# Patient Record
Sex: FEMALE | Race: WHITE
[De-identification: names, ages, dates, MRNs, and addresses within clinical notes are randomized per-mention and may not be internally consistent; named-entity substitution may affect disease eponyms.]

---

## 2018-12-03 PROBLEM — Z00.00 ENCOUNTER FOR PREVENTIVE HEALTH EXAMINATION: Status: ACTIVE | Noted: 2018-12-03

## 2018-12-28 ENCOUNTER — APPOINTMENT (OUTPATIENT)
Dept: NEPHROLOGY | Facility: CLINIC | Age: 83
End: 2018-12-28
Payer: MEDICARE

## 2018-12-28 VITALS — DIASTOLIC BLOOD PRESSURE: 76 MMHG | WEIGHT: 198 LBS | SYSTOLIC BLOOD PRESSURE: 130 MMHG | HEART RATE: 80 BPM

## 2018-12-28 DIAGNOSIS — E87.2 ACIDOSIS: ICD-10-CM

## 2018-12-28 DIAGNOSIS — Z78.9 OTHER SPECIFIED HEALTH STATUS: ICD-10-CM

## 2018-12-28 DIAGNOSIS — Z87.42 PERSONAL HISTORY OF OTHER DISEASES OF THE FEMALE GENITAL TRACT: ICD-10-CM

## 2018-12-28 DIAGNOSIS — N17.9 ACUTE KIDNEY FAILURE, UNSPECIFIED: ICD-10-CM

## 2018-12-28 DIAGNOSIS — N20.0 CALCULUS OF KIDNEY: ICD-10-CM

## 2018-12-28 PROCEDURE — 99205 OFFICE O/P NEW HI 60 MIN: CPT

## 2018-12-31 LAB
ALBUMIN SERPL ELPH-MCNC: 5 G/DL
ANION GAP SERPL CALC-SCNC: 17 MMOL/L
APPEARANCE: CLEAR
BACTERIA UR CULT: NORMAL
BACTERIA: NEGATIVE
BILIRUBIN URINE: NEGATIVE
BLOOD URINE: NEGATIVE
BUN SERPL-MCNC: 39 MG/DL
CALCIUM SERPL-MCNC: 10.1 MG/DL
CALCIUM SERPL-MCNC: 10.1 MG/DL
CHLORIDE SERPL-SCNC: 102 MMOL/L
CO2 SERPL-SCNC: 22 MMOL/L
COLOR: YELLOW
CREAT SERPL-MCNC: 1.62 MG/DL
CREAT SPEC-SCNC: 116 MG/DL
GLUCOSE QUALITATIVE U: NEGATIVE MG/DL
GLUCOSE SERPL-MCNC: 110 MG/DL
HYALINE CASTS: 3 /LPF
KETONES URINE: NEGATIVE
LEUKOCYTE ESTERASE URINE: ABNORMAL
MICROALBUMIN 24H UR DL<=1MG/L-MCNC: <1.2 MG/DL
MICROALBUMIN/CREAT 24H UR-RTO: NORMAL
MICROSCOPIC-UA: NORMAL
NITRITE URINE: NEGATIVE
PARATHYROID HORMONE INTACT: 175 PG/ML
PH URINE: 5.5
PHOSPHATE SERPL-MCNC: 3.2 MG/DL
POTASSIUM SERPL-SCNC: 3.7 MMOL/L
PROTEIN URINE: NEGATIVE MG/DL
RED BLOOD CELLS URINE: 2 /HPF
SODIUM SERPL-SCNC: 141 MMOL/L
SPECIFIC GRAVITY URINE: 1.02
SQUAMOUS EPITHELIAL CELLS: 1 /HPF
UROBILINOGEN URINE: NEGATIVE MG/DL
WHITE BLOOD CELLS URINE: 3 /HPF

## 2019-07-08 ENCOUNTER — APPOINTMENT (OUTPATIENT)
Dept: NEPHROLOGY | Facility: CLINIC | Age: 84
End: 2019-07-08

## 2023-09-06 ENCOUNTER — INPATIENT (INPATIENT)
Facility: HOSPITAL | Age: 88
LOS: 12 days | Discharge: NURSING, REHAB OR LONG-TERM CARE | DRG: 562 | End: 2023-09-19
Attending: STUDENT IN AN ORGANIZED HEALTH CARE EDUCATION/TRAINING PROGRAM | Admitting: INTERNAL MEDICINE
Payer: MEDICARE

## 2023-09-06 VITALS
WEIGHT: 179.9 LBS | OXYGEN SATURATION: 97 % | RESPIRATION RATE: 16 BRPM | DIASTOLIC BLOOD PRESSURE: 64 MMHG | HEART RATE: 79 BPM | SYSTOLIC BLOOD PRESSURE: 152 MMHG | TEMPERATURE: 98 F

## 2023-09-06 DIAGNOSIS — S82.832A OTHER FRACTURE OF UPPER AND LOWER END OF LEFT FIBULA, INITIAL ENCOUNTER FOR CLOSED FRACTURE: ICD-10-CM

## 2023-09-06 LAB
ALBUMIN SERPL ELPH-MCNC: 3.2 G/DL — LOW (ref 3.3–5)
ALP SERPL-CCNC: 94 U/L — SIGNIFICANT CHANGE UP (ref 40–120)
ALT FLD-CCNC: 16 U/L — SIGNIFICANT CHANGE UP (ref 10–45)
ANION GAP SERPL CALC-SCNC: 7 MMOL/L — SIGNIFICANT CHANGE UP (ref 5–17)
APPEARANCE UR: CLEAR — SIGNIFICANT CHANGE UP
AST SERPL-CCNC: 18 U/L — SIGNIFICANT CHANGE UP (ref 10–40)
BACTERIA # UR AUTO: ABNORMAL /HPF
BASOPHILS # BLD AUTO: 0.05 K/UL — SIGNIFICANT CHANGE UP (ref 0–0.2)
BASOPHILS NFR BLD AUTO: 0.6 % — SIGNIFICANT CHANGE UP (ref 0–2)
BILIRUB SERPL-MCNC: 0.7 MG/DL — SIGNIFICANT CHANGE UP (ref 0.2–1.2)
BILIRUB UR-MCNC: NEGATIVE — SIGNIFICANT CHANGE UP
BUN SERPL-MCNC: 26 MG/DL — HIGH (ref 7–23)
CALCIUM SERPL-MCNC: 9.9 MG/DL — SIGNIFICANT CHANGE UP (ref 8.4–10.5)
CHLORIDE SERPL-SCNC: 107 MMOL/L — SIGNIFICANT CHANGE UP (ref 96–108)
CO2 SERPL-SCNC: 24 MMOL/L — SIGNIFICANT CHANGE UP (ref 22–31)
COLOR SPEC: YELLOW — SIGNIFICANT CHANGE UP
CREAT SERPL-MCNC: 1.28 MG/DL — SIGNIFICANT CHANGE UP (ref 0.5–1.3)
DIFF PNL FLD: NEGATIVE — SIGNIFICANT CHANGE UP
EGFR: 41 ML/MIN/1.73M2 — LOW
EOSINOPHIL # BLD AUTO: 0.15 K/UL — SIGNIFICANT CHANGE UP (ref 0–0.5)
EOSINOPHIL NFR BLD AUTO: 1.9 % — SIGNIFICANT CHANGE UP (ref 0–6)
EPI CELLS # UR: 1 — SIGNIFICANT CHANGE UP
GLUCOSE SERPL-MCNC: 105 MG/DL — HIGH (ref 70–99)
GLUCOSE UR QL: NEGATIVE MG/DL — SIGNIFICANT CHANGE UP
HCT VFR BLD CALC: 36.4 % — SIGNIFICANT CHANGE UP (ref 34.5–45)
HGB BLD-MCNC: 11.9 G/DL — SIGNIFICANT CHANGE UP (ref 11.5–15.5)
IMM GRANULOCYTES NFR BLD AUTO: 0.8 % — SIGNIFICANT CHANGE UP (ref 0–0.9)
KETONES UR-MCNC: NEGATIVE MG/DL — SIGNIFICANT CHANGE UP
LEUKOCYTE ESTERASE UR-ACNC: NEGATIVE — SIGNIFICANT CHANGE UP
LYMPHOCYTES # BLD AUTO: 1.39 K/UL — SIGNIFICANT CHANGE UP (ref 1–3.3)
LYMPHOCYTES # BLD AUTO: 17.6 % — SIGNIFICANT CHANGE UP (ref 13–44)
MCHC RBC-ENTMCNC: 29.5 PG — SIGNIFICANT CHANGE UP (ref 27–34)
MCHC RBC-ENTMCNC: 32.7 GM/DL — SIGNIFICANT CHANGE UP (ref 32–36)
MCV RBC AUTO: 90.1 FL — SIGNIFICANT CHANGE UP (ref 80–100)
MONOCYTES # BLD AUTO: 0.43 K/UL — SIGNIFICANT CHANGE UP (ref 0–0.9)
MONOCYTES NFR BLD AUTO: 5.4 % — SIGNIFICANT CHANGE UP (ref 2–14)
NEUTROPHILS # BLD AUTO: 5.83 K/UL — SIGNIFICANT CHANGE UP (ref 1.8–7.4)
NEUTROPHILS NFR BLD AUTO: 73.7 % — SIGNIFICANT CHANGE UP (ref 43–77)
NITRITE UR-MCNC: NEGATIVE — SIGNIFICANT CHANGE UP
NRBC # BLD: 0 /100 WBCS — SIGNIFICANT CHANGE UP (ref 0–0)
PH UR: 5 — SIGNIFICANT CHANGE UP (ref 5–8)
PLATELET # BLD AUTO: 230 K/UL — SIGNIFICANT CHANGE UP (ref 150–400)
POTASSIUM SERPL-MCNC: 4.9 MMOL/L — SIGNIFICANT CHANGE UP (ref 3.5–5.3)
POTASSIUM SERPL-SCNC: 4.9 MMOL/L — SIGNIFICANT CHANGE UP (ref 3.5–5.3)
PROT SERPL-MCNC: 6.8 G/DL — SIGNIFICANT CHANGE UP (ref 6–8.3)
PROT UR-MCNC: 30 MG/DL
RBC # BLD: 4.04 M/UL — SIGNIFICANT CHANGE UP (ref 3.8–5.2)
RBC # FLD: 12.8 % — SIGNIFICANT CHANGE UP (ref 10.3–14.5)
RBC CASTS # UR COMP ASSIST: 0 /HPF — SIGNIFICANT CHANGE UP (ref 0–4)
SARS-COV-2 RNA SPEC QL NAA+PROBE: DETECTED
SODIUM SERPL-SCNC: 138 MMOL/L — SIGNIFICANT CHANGE UP (ref 135–145)
SP GR SPEC: 1.01 — SIGNIFICANT CHANGE UP (ref 1–1.03)
UROBILINOGEN FLD QL: 0.2 MG/DL — SIGNIFICANT CHANGE UP (ref 0.2–1)
WBC # BLD: 7.91 K/UL — SIGNIFICANT CHANGE UP (ref 3.8–10.5)
WBC # FLD AUTO: 7.91 K/UL — SIGNIFICANT CHANGE UP (ref 3.8–10.5)
WBC UR QL: 2 /HPF — SIGNIFICANT CHANGE UP (ref 0–5)

## 2023-09-06 PROCEDURE — 99223 1ST HOSP IP/OBS HIGH 75: CPT

## 2023-09-06 PROCEDURE — 73630 X-RAY EXAM OF FOOT: CPT | Mod: 26,LT

## 2023-09-06 PROCEDURE — 70450 CT HEAD/BRAIN W/O DYE: CPT | Mod: 26,MA

## 2023-09-06 PROCEDURE — 73610 X-RAY EXAM OF ANKLE: CPT | Mod: 26,LT

## 2023-09-06 PROCEDURE — 71045 X-RAY EXAM CHEST 1 VIEW: CPT | Mod: 26

## 2023-09-06 PROCEDURE — 93010 ELECTROCARDIOGRAM REPORT: CPT

## 2023-09-06 PROCEDURE — 99285 EMERGENCY DEPT VISIT HI MDM: CPT | Mod: FS

## 2023-09-06 RX ORDER — ACETAMINOPHEN 500 MG
650 TABLET ORAL EVERY 6 HOURS
Refills: 0 | Status: DISCONTINUED | OUTPATIENT
Start: 2023-09-06 | End: 2023-09-19

## 2023-09-06 RX ORDER — LANOLIN ALCOHOL/MO/W.PET/CERES
3 CREAM (GRAM) TOPICAL AT BEDTIME
Refills: 0 | Status: DISCONTINUED | OUTPATIENT
Start: 2023-09-06 | End: 2023-09-19

## 2023-09-06 RX ORDER — ATORVASTATIN CALCIUM 80 MG/1
10 TABLET, FILM COATED ORAL AT BEDTIME
Refills: 0 | Status: DISCONTINUED | OUTPATIENT
Start: 2023-09-06 | End: 2023-09-19

## 2023-09-06 RX ORDER — ONDANSETRON 8 MG/1
4 TABLET, FILM COATED ORAL EVERY 8 HOURS
Refills: 0 | Status: DISCONTINUED | OUTPATIENT
Start: 2023-09-06 | End: 2023-09-19

## 2023-09-06 RX ORDER — ENOXAPARIN SODIUM 100 MG/ML
40 INJECTION SUBCUTANEOUS EVERY 24 HOURS
Refills: 0 | Status: DISCONTINUED | OUTPATIENT
Start: 2023-09-06 | End: 2023-09-19

## 2023-09-06 RX ORDER — QUETIAPINE FUMARATE 200 MG/1
25 TABLET, FILM COATED ORAL AT BEDTIME
Refills: 0 | Status: DISCONTINUED | OUTPATIENT
Start: 2023-09-06 | End: 2023-09-06

## 2023-09-06 RX ADMIN — ATORVASTATIN CALCIUM 10 MILLIGRAM(S): 80 TABLET, FILM COATED ORAL at 22:55

## 2023-09-06 NOTE — ED PROVIDER NOTE - NSICDXPASTMEDICALHX_GEN_ALL_CORE_FT
PAST MEDICAL HISTORY:  Chronic kidney disease, unspecified CKD stage      PAST MEDICAL HISTORY:  Chronic kidney disease, unspecified CKD stage     HLD (hyperlipidemia)     HTN (hypertension)

## 2023-09-06 NOTE — ED ADULT NURSE NOTE - NSFALLUNIVINTERV_ED_ALL_ED
Bed/Stretcher in lowest position, wheels locked, appropriate side rails in place/Call bell, personal items and telephone in reach/Instruct patient to call for assistance before getting out of bed/chair/stretcher/Non-slip footwear applied when patient is off stretcher/[place] to call system/Physically safe environment - no spills, clutter or unnecessary equipment/Purposeful proactive rounding/Room/bathroom lighting operational, light cord in reach

## 2023-09-06 NOTE — ED PROVIDER NOTE - OBJECTIVE STATEMENT
88 yr old female with hx of dementia, recent positive covid on 8/29 and was on paxlovid with recent negative test as per son presents with left ankle pain and swelling. Son and daughter in law at bedside and [place] but since dx with covid pt has been living with them. Pt has chronic unsteady when walking. Pt was found sitting on buttock at house at 3 am 3 days ago. Pt now unable to walk since fall. Unsure mechanism of fall. Unknown if hit head. + swelling to left ankle.

## 2023-09-06 NOTE — ED PROVIDER NOTE - ATTENDING APP SHARED VISIT CONTRIBUTION OF CARE
Kayden with [facility]. 88 yr old female with hx of dementia, recent positive covid on 8/29 and was on paxlovid with recent negative test as per son presents with left ankle pain and swelling. Son and daughter in law at bedside and reports [place] the city alone but since dx with covid pt has been living with them. Pt has chronic unsteady when walking. Pt was found sitting on buttock at house at 3 am 3 days ago. Pt now unable to walk since fall. Unsure mechanism of fall. Unknown if hit head. + swelling to left ankle.  left ankle/ foot: tenderness medially, pain on ROM, + swelling, positive 2+ pedal pulse   spine- no bony tenderness    Family reports pt unable to live with them.   labs, xray, ekg, and likely admit to medicine due to unable to ambulate and not a safe discharge.    1620: labs and imaging reviewed. + ankle fx seen, stirrup splint applied. pt tolerated with no complaints. plan to admit to medicine. d/w hospitalist. Pt unable to care for self. Also with dementia living alone.     I performed a face to face bedside interview with patient regarding history of present illness, review of symptoms and past medical history. I completed an independent physical exam.  I have discussed the patient's plan of care with Physician Assistant (PA). I agree with note as stated above, having amended the EMR as needed to reflect my findings.   This includes History of Present Illness, HIV, Past Medical/Surgical/Family/Social History, Allergies and Home Medications, Review of Systems, Physical Exam, and any Progress Notes during the time I functioned as the attending physician for this patient.

## 2023-09-06 NOTE — ED PROVIDER NOTE - CLINICAL SUMMARY MEDICAL DECISION MAKING FREE TEXT BOX
88 yr old female with hx of dementia, recent positive covid on 8/29 and was on paxlovid with recent negative test as per son presents with left ankle pain and swelling. Son and daughter in law at bedside and reports [place] the city alone but since dx with covid pt has been living with them. Pt has chronic unsteady when walking. Pt was found sitting on buttock at house at 3 am 3 days ago. Pt now unable to walk since fall. Unsure mechanism of fall. Unknown if hit head. + swelling to left ankle.  left ankle/ foot: tenderness medially, pain on ROM, + swelling, positive 2+ pedal pulse   spine- no bony tenderness    Family reports pt unable to live with them.   labs, xray, ekg, and likely admit to medicine due to unable to ambulate and not a safe discharge. 88 yr old female with hx of dementia, recent positive covid on 8/29 and was on paxlovid with recent negative test as per son presents with left ankle pain and swelling. Son and daughter in law at bedside and reports [place] the city alone but since dx with covid pt has been living with them. Pt has chronic unsteady when walking. Pt was found sitting on buttock at house at 3 am 3 days ago. Pt now unable to walk since fall. Unsure mechanism of fall. Unknown if hit head. + swelling to left ankle.  left ankle/ foot: tenderness medially, pain on ROM, + swelling, positive 2+ pedal pulse   spine- no bony tenderness    Family reports pt unable to live with them.   labs, xray, ekg, and likely admit to medicine due to unable to ambulate and not a safe discharge.    1620: labs and imaging reviewed. + ankle fx seen, stirrup splint applied. pt tolerated with no complaints. plan to admit to medicine. d/w hospitalist.

## 2023-09-06 NOTE — ED ADULT TRIAGE NOTE - MODE OF ARRIVAL
Ambulance Routing refill request to provider for review/approval because:  Labs not current:  LDL    Suri Reid RN           EMS

## 2023-09-06 NOTE — ED PROVIDER NOTE - NS ED ATTENDING STATEMENT MOD
This was a shared visit with the [place]. I reviewed and verified the documentation and independently performed the documented:

## 2023-09-06 NOTE — H&P ADULT - NSHPSOCIALHISTORY_GEN_ALL_CORE
Social History:    Marital Status: (  ) , (  ) Single, (  ) , (  ) , (  )   # of Children:   Lives with: (  ) alone, (  ) children, (  ) spouse, (  ) parents, (  ) siblings, (  ) friends, (  ) other:   Occupation:     Substance Use/Illicit Drugs: (  ) never used vs other:   Tobacco Usage: (  ) never smoked, (  ) former smoker, (  ) current smoker and Total Pack-Years:   Last Alcohol Usage/Frequency/Amount/Withdrawal/Hx of Abuse:    Foreign travel:   Animal exposure: Social History:    Marital Status: (  ) , (x  ) Single, (  ) , (  ) , (  )   # of Children: 2  Lives with: (x  ) alone, (  ) children, (  ) spouse, (  ) parents, (  ) siblings, (  ) friends, (  ) other:     Substance Use/Illicit Drugs: ( x ) never used vs other:   Tobacco Usage: (x  ) never smoked, (  ) former smoker, (  ) current smoker and Total Pack-Years:   Last Alcohol Usage/Frequency/Amount/Withdrawal/Hx of Abuse:  Denies  Foreign travel: Denies  Animal exposure:Denies

## 2023-09-06 NOTE — H&P ADULT - NSHPLABSRESULTS_GEN_ALL_CORE
. 11.9   7.91  )-----------( 230      ( 06 Sep 2023 16:20 )             36.4       09-06    138  |  107  |  26  ----------------------------<  105  4.9   |  24  |  1.28    Ca    9.9      06 Sep 2023 16:20    TPro  6.8  /  Alb  3.2  /  TBili  0.7  /  DBili  x   /  AST  18  /  ALT  16  /  AlkPhos  94  09-06    Urinalysis Basic - ( 06 Sep 2023 16:20 )    Color: x / Appearance: x / SG: x / pH: x  Gluc: 105 mg/dL / Ketone: x  / Bili: x / Urobili: x   Blood: x / Protein: x / Nitrite: x   Leuk Esterase: x / RBC: x / WBC x   Sq Epi: x / Non Sq Epi: x / Bacteria: x    COVID-19 PCR: Detected (09-06-23 @ 16:20)  COVID-19 PCR: Detected (06 Sep 2023 16:20)        RADIOLOGY  Xray Ankle Complete 3 Views, Left (09.06.23 @ 15:08) >  IMPRESSION:  No acute pulmonary disease.  Moderate swelling centered at the ankle.  Displaced oblique fracture of the distal fibula/lateral malleolus    CT Head No Cont (09.06.23 @ 14:54) >    IMPRESSION: No acute intracranial hemorrhage, mass effect, or shift of   the midline structures.    Imaging findings for which normal pressure hydrocephalus can be   considered. Clinical correlation is required for this diagnosis.    Mild chronic white matter microvascular type changes.    Chronic pansinusitis.      Consultant(s) Notes Reveiwed [x ] Yes   ORtho called by ER  Care Discussed with [ ] Consultants  [x ] Patient  [x ] Family  [ ] /   [ ] Other; RN

## 2023-09-06 NOTE — H&P ADULT - ASSESSMENT
89 yo F from home with PMH of CKD, dementia, recent COVID + 8/29 s/p paxlovid, presenting with left ankle pain.    #Left acute distal frature of fibular/lateral malleolus, s/p fall  - Will admit to medicine  - CONSULT: Orthopedics consulted by ER  - NWB for now until clarified by ortho; hold PT eval  - Pain medications prn  - Strict bedrest, fall precautions/bed alarm    #Recent COVID  - Apparently diagnosed 8/29, completed paxlovid  - Will place on isolation to complete ten day course which will be 9/7/2023    #Hx of Dementia    #Hx of CKD  - Monitor creatinine    DVT PPX  Am labs  DISP: From home, pending course 87 yo F from home with PMH of CKD, dementia, recent COVID + 8/28 s/p paxlovid, presenting with left ankle pain.    #Left acute distal fracture of fibula/lateral malleolus, s/p fall  #Gait imbalance  - Will admit to medicine  - CONSULT: Orthopedics consulted by ER  - NWB for now until clarified by ortho; hold PT eval  - Pain medications prn  - Strict bedrest, fall precautions/bed alarm  - Tylenol prn pain  - Will check B12/folate/TSH/FT4 given gait imbalance    #Recent COVID  - Apparently diagnosed 8/28 by [facility] , completed paxlovid  - HERE, still testing + for COVID; I told daughter we need proof from [facility] of first positive test  - Would recommend discussing case in AM with infection control re: isolation precautions  - To be on safer side, will place on isolation tonight as per hospital protocol to complete ten day course    #HTN  #HLD  - C/w lipitor 10mg qhs  - Takes bystolic 5mg daily at home; Will ask family to bring it in    #Hx of Dementia  - Will monitor, not on medications  - Fall precautions    #Hx of CKD  - Monitor creatinine    DVT PPX: Lovenox  Am labs  DISP: From home, pending course  9/6: Daughter Edelmira at bedside; answered all questions, in agreement with care plan; wanted a copy of blood work results; I instructed her to download [facility] moises

## 2023-09-06 NOTE — H&P ADULT - NSHPREVIEWOFSYSTEMS_GEN_ALL_CORE
REVIEW OF SYSTEMS:  CONSTITUTIONAL: No fever, weight loss, or fatigue  EYES: No eye pain, visual disturbances, or discharge  ENMT:  No difficulty hearing, tinnitus, vertigo; No sinus or throat pain  NECK: No pain or stiffness  BREASTS: No pain, masses, or nipple discharge  RESPIRATORY: No cough, wheezing, chills or hemoptysis; No shortness of breath  CARDIOVASCULAR: No chest pain, palpitations, dizziness, or leg swelling  GASTROINTESTINAL: No abdominal or epigastric pain. No nausea, vomiting, or hematemesis; No diarrhea or constipation. No melena or hematochezia.  GENITOURINARY: No dysuria, frequency, hematuria, or incontinence  NEUROLOGICAL: No headaches, memory loss, loss of strength, numbness, or tremors  SKIN: No itching, burning, rashes, or lesions   LYMPH NODES: No enlarged glands  ENDOCRINE: No heat or cold intolerance; No hair loss  MUSCULOSKELETAL: No joint pain or swelling; No muscle, back, or extremity pain  PSYCHIATRIC: No depression, anxiety, mood swings, or difficulty sleeping  HEME/LYMPH: No easy bruising, or bleeding gums  ALLERY AND IMMUNOLOGIC: No hives or eczema    ALL ROS REVIEWED AND NORMAL EXCEPT AS STATED ABOVE REVIEW OF SYSTEMS:  CONSTITUTIONAL: No fever, weight loss, or fatigue  EYES: No eye pain, visual disturbances, or discharge  ENMT:  No difficulty hearing, tinnitus, vertigo; No sinus or throat pain  NECK: No pain or stiffness  BREASTS: No pain, masses, or nipple discharge  RESPIRATORY: No cough, wheezing, chills or hemoptysis; No shortness of breath  CARDIOVASCULAR: No chest pain, palpitations, dizziness, or leg swelling  GASTROINTESTINAL: No abdominal or epigastric pain. No nausea, vomiting, or hematemesis; No diarrhea or constipation. No melena or hematochezia.  GENITOURINARY: No dysuria, frequency, hematuria, or incontinence  NEUROLOGICAL: No headaches, memory loss, loss of strength, numbness, or tremors  SKIN: No itching, burning, rashes, or lesions   LYMPH NODES: No enlarged glands  ENDOCRINE: No heat or cold intolerance; No hair loss  MUSCULOSKELETAL: pain in left ankle, foot  PSYCHIATRIC: No depression, anxiety, mood swings, or difficulty sleeping  HEME/LYMPH: No easy bruising, or bleeding gums  ALLERY AND IMMUNOLOGIC: No hives or eczema    ALL ROS REVIEWED AND NORMAL EXCEPT AS STATED ABOVE

## 2023-09-06 NOTE — H&P ADULT - NSHPPHYSICALEXAM_GEN_ALL_CORE
Vital Signs Last 24 Hrs  T(F): 98.5 (06 Sep 2023 13:59), Max: 98.5 (06 Sep 2023 13:59)  HR: 79 (06 Sep 2023 13:59) (79 - 79)  BP: 152/64 (06 Sep 2023 13:59) (152/64 - 152/64)  RR: 16 (06 Sep 2023 13:59) (16 - 16)  SpO2: 97% (06 Sep 2023 13:59) (97% - 97%)    PHYSICAL EXAM:  GENERAL: NAD, well-groomed, well-developed  HEAD:  Atraumatic, Normocephalic  EYES: EOMI, conjunctiva and sclera clear  ENMT: Moist mucous membranes, Good dentition, no thrush  NECK: Supple, No JVD  CHEST/LUNG: Clear to auscultation bilaterally, good air entry, non-labored breathing  HEART: RRR; S1/S2, No murmur  ABDOMEN: Soft, Nontender, Nondistended; Bowel sounds present  VASCULAR: Normal pulses, Normal capillary refill  EXTREMITIES: No calf tenderness, No cyanosis, No edema  LYMPH: Normal; No lymphadenopathy noted  SKIN: Warm, Intact  PSYCH: Normal mood, Normal affect  NERVOUS SYSTEM:  A/O x3, Good concentration; CN 2-12 intact, No focal deficits Vital Signs Last 24 Hrs  T(F): 98.5 (06 Sep 2023 13:59), Max: 98.5 (06 Sep 2023 13:59)  HR: 79 (06 Sep 2023 13:59) (79 - 79)  BP: 152/64 (06 Sep 2023 13:59) (152/64 - 152/64)  RR: 16 (06 Sep 2023 13:59) (16 - 16)  SpO2: 97% (06 Sep 2023 13:59) (97% - 97%)    PHYSICAL EXAM:  GENERAL: NAD, well-groomed, well-developed  HEAD:  Atraumatic, Normocephalic  EYES: EOMI, conjunctiva and sclera clear  ENMT: Moist mucous membranes, Good dentition, no thrush  NECK: Supple, No JVD  CHEST/LUNG: Clear to auscultation bilaterally, good air entry, non-labored breathing  HEART: RRR; S1/S2, No murmur  ABDOMEN: Soft, Nontender, Nondistended; Bowel sounds present  VASCULAR: Normal pulses, Normal capillary refill  EXTREMITIES: Left ankle wrapped, cast in place, onychomycosis of toe nails noted, severe overgrowth noted, No calf tenderness, No cyanosis, No edema  LYMPH: Normal; No lymphadenopathy noted  SKIN: Warm, Intact  PSYCH: Normal mood, Normal affect  NERVOUS SYSTEM:  A/O x3, Good concentration; CN 2-12 intact, No focal deficits

## 2023-09-06 NOTE — H&P ADULT - NSICDXPASTMEDICALHX_GEN_ALL_CORE_FT
PAST MEDICAL HISTORY:  Chronic kidney disease, unspecified CKD stage     HLD (hyperlipidemia)     HTN (hypertension)

## 2023-09-06 NOTE — ED PROVIDER NOTE - PHYSICAL EXAMINATION
with exertion left ankle/ foot: tenderness medially, pain on ROM, + swelling, positive 2+ pedal pulse   spine- no bony tenderness

## 2023-09-06 NOTE — ED ADULT NURSE NOTE - OBJECTIVE STATEMENT
left ankle pain after fall on Sunday night. swollen and tender with bruising noted. unable to ambulate.

## 2023-09-06 NOTE — ED PROVIDER NOTE - CARE PLAN
Principal Discharge DX:	Closed fracture of left distal fibula  Secondary Diagnosis:	History of unsteady gait   1

## 2023-09-06 NOTE — H&P ADULT - HISTORY OF PRESENT ILLNESS
87 yo F from home with PMH of CKD, dementia, recent COVID + 8/29 s/p paxlovid, presenting with left ankle pain.  Patient has been staying  with her children since her COVID diagnosis.  She was found sitting on floor three days ago; since then, she reports pain and   inability to bear weight on left foot with ankle pain.       87 yo F from home with PMH of CKD, dementia, recent COVID + 8/28 s/p paxlovid, presenting with left ankle pain.  Patient has been staying with her children since her COVID diagnosis.  COVID was tested at [facility].  Patient has completed five days of paxlovid.     According to the daughter, on Monday at 2AM, she heard someone calling her name; when her family went into patient's room, she was found sitting on floor; patient denies LOC, dizziness/lightheadness; she reports she slipped and fell.  Family did notice slight swelling of her left ankle and extreme pain when she tried to walk,  Today, her pain worsened and she could not walk and family brought her to the ER.

## 2023-09-07 LAB
ANION GAP SERPL CALC-SCNC: 8 MMOL/L — SIGNIFICANT CHANGE UP (ref 5–17)
BUN SERPL-MCNC: 24 MG/DL — HIGH (ref 7–23)
CALCIUM SERPL-MCNC: 9.8 MG/DL — SIGNIFICANT CHANGE UP (ref 8.4–10.5)
CHLORIDE SERPL-SCNC: 108 MMOL/L — SIGNIFICANT CHANGE UP (ref 96–108)
CO2 SERPL-SCNC: 26 MMOL/L — SIGNIFICANT CHANGE UP (ref 22–31)
CREAT SERPL-MCNC: 1.28 MG/DL — SIGNIFICANT CHANGE UP (ref 0.5–1.3)
EGFR: 40 ML/MIN/1.73M2 — LOW
FOLATE SERPL-MCNC: 10.4 NG/ML — SIGNIFICANT CHANGE UP
GLUCOSE SERPL-MCNC: 105 MG/DL — HIGH (ref 70–99)
MAGNESIUM SERPL-MCNC: 1.3 MG/DL — LOW (ref 1.6–2.6)
POTASSIUM SERPL-MCNC: 4.1 MMOL/L — SIGNIFICANT CHANGE UP (ref 3.5–5.3)
POTASSIUM SERPL-SCNC: 4.1 MMOL/L — SIGNIFICANT CHANGE UP (ref 3.5–5.3)
SODIUM SERPL-SCNC: 142 MMOL/L — SIGNIFICANT CHANGE UP (ref 135–145)
T4 FREE SERPL-MCNC: 1 NG/DL — SIGNIFICANT CHANGE UP (ref 0.9–1.8)
TSH SERPL-MCNC: 2.6 UIU/ML — SIGNIFICANT CHANGE UP (ref 0.36–3.74)
VIT B12 SERPL-MCNC: 477 PG/ML — SIGNIFICANT CHANGE UP (ref 232–1245)

## 2023-09-07 PROCEDURE — 99232 SBSQ HOSP IP/OBS MODERATE 35: CPT

## 2023-09-07 RX ORDER — OXYCODONE HYDROCHLORIDE 5 MG/1
5 TABLET ORAL EVERY 6 HOURS
Refills: 0 | Status: DISCONTINUED | OUTPATIENT
Start: 2023-09-07 | End: 2023-09-14

## 2023-09-07 RX ORDER — MAGNESIUM OXIDE 400 MG ORAL TABLET 241.3 MG
400 TABLET ORAL ONCE
Refills: 0 | Status: COMPLETED | OUTPATIENT
Start: 2023-09-07 | End: 2023-09-07

## 2023-09-07 RX ORDER — NEBIVOLOL HYDROCHLORIDE 5 MG/1
5 TABLET ORAL DAILY
Refills: 0 | Status: DISCONTINUED | OUTPATIENT
Start: 2023-09-07 | End: 2023-09-19

## 2023-09-07 RX ADMIN — ATORVASTATIN CALCIUM 10 MILLIGRAM(S): 80 TABLET, FILM COATED ORAL at 21:46

## 2023-09-07 RX ADMIN — MAGNESIUM OXIDE 400 MG ORAL TABLET 400 MILLIGRAM(S): 241.3 TABLET ORAL at 14:37

## 2023-09-07 RX ADMIN — ENOXAPARIN SODIUM 40 MILLIGRAM(S): 100 INJECTION SUBCUTANEOUS at 09:38

## 2023-09-07 RX ADMIN — NEBIVOLOL HYDROCHLORIDE 5 MILLIGRAM(S): 5 TABLET ORAL at 10:06

## 2023-09-07 NOTE — PROGRESS NOTE ADULT - ASSESSMENT
87 yo F from home with PMH of CKD, dementia, recent COVID + 8/28 s/p paxlovid, presenting with s/p fall with left ankle pain found to have acute left distal fibula/lateral malleolus fracture    #Left acute distal fracture of fibula/lateral malleolus, s/p fall  #Gait imbalance  - Ortho consult called today  - NWB for now until clarified by ortho; hold PT eval  - Strict bedrest, fall precautions/bed alarm  - Pain medication PRN  - B12/folate/TSH/FT4 WNL    #Recent COVID  - Apparently diagnosed 8/28 by [facility] , completed paxlovid  - HERE, still testing + for COVID; asymptomatic   - Would recommend discussing case in AM with infection control re: isolation precautions  - To be on safer side, will place on isolation tonight as per hospital protocol to complete ten day course    Hypomage  - Repleted  - Monitor level    #HTN  #HLD  - Continue bystolic and atorvastatin     #Hx of Dementia  - Will monitor, not on medications  - B12/folate/TSH/FT4 WNL  - Fall precautions    #Hx of CKD  - Cr 1.4 range in 1/2023, at baseline  - Avoid nephrotoxic agents   - Monitor creatinine    DVT PPx  - Lovenox SC    DISP: From home, pending course    Spoke with Daughter Edelmira at bedside; answered all questions, in agreement with care plan. 87 yo F from home with PMH of CKD, dementia, recent COVID + 8/28 s/p paxlovid, presenting with s/p fall with left ankle pain found to have acute left distal fibula/lateral malleolus fracture    #Left acute distal fracture of fibula/lateral malleolus, s/p fall  #Gait imbalance  - Ortho consult called today  - NWB for now until clarified by ortho; hold PT eval  - Strict bedrest, fall precautions/bed alarm  - Pain medication PRN    #Recent COVID  - Apparently diagnosed 8/28 by [facility] , completed paxlovid  - HERE, still testing + for COVID; asymptomatic   - Would recommend discussing case in AM with infection control re: isolation precautions - no answer, will try again tomorrow  - To be on safer side, will place on isolation tonight as per hospital protocol to complete ten day course from 8/28     Hypomage  - Repleted  - Monitor level    #HTN  #HLD  - Continue bystolic and atorvastatin   - Monitor BP    #Hx of Dementia  - Will monitor, not on medications  - B12/folate/TSH/FT4 WNL  - Fall precautions    #Hx of CKD  - Cr 1.4 range in 1/2023, at baseline  - Avoid nephrotoxic agents   - Monitor creatinine    DVT PPx  - Lovenox SC    DISP: From home, pending course    Spoke with Daughter Edelmira at bedside; answered all questions, in agreement with care plan.

## 2023-09-07 NOTE — CONSULT NOTE ADULT - ASSESSMENT
88 year old female with hx of htn, hld, chronic CKD presents s/p fall with Left ankle fracture .  Patient family member is a physician in [place]. and would like to speak to Dr Ceballos and see xrays.     Will discuss with Dr Ceballos   IMP: and Xray Findings:  Moderate swelling centered at the ankle. Displaced oblique   fracture of the distal fibula/lateral malleolus. No other acute fracture   or dislocation. Ankle mortise intact.    PLan:  NWB left ankle            Elevate left ankle             ice pack             DVT prophylaxis             Conservative treatment in splint for now until discussed with Dr Ceballos -             pain management

## 2023-09-07 NOTE — PROGRESS NOTE ADULT - SUBJECTIVE AND OBJECTIVE BOX
Interval History  Patient seen and examined at bedside. No overnight events noted.  Patient is AAO x 2, no acute complaints, pain is controlled. Rest of ROS is negative.     ALLERGIES:  sulfa drugs (Unknown)    MEDICATIONS  (STANDING):  atorvastatin 10 milliGRAM(s) Oral at bedtime  enoxaparin Injectable 40 milliGRAM(s) SubCutaneous every 24 hours  nebivolol 5 milliGRAM(s) Oral daily    MEDICATIONS  (PRN):  acetaminophen     Tablet .. 650 milliGRAM(s) Oral every 6 hours PRN Temp greater or equal to 38C (100.4F), Mild Pain (1 - 3)  aluminum hydroxide/magnesium hydroxide/simethicone Suspension 30 milliLiter(s) Oral every 4 hours PRN Dyspepsia  melatonin 3 milliGRAM(s) Oral at bedtime PRN Insomnia  ondansetron Injectable 4 milliGRAM(s) IV Push every 8 hours PRN Nausea and/or Vomiting    Vital Signs Last 24 Hrs  T(F): 98.7 (07 Sep 2023 11:34), Max: 98.7 (07 Sep 2023 11:34)  HR: 75 (07 Sep 2023 11:34) (69 - 90)  BP: 164/72 (07 Sep 2023 11:34) (142/78 - 190/74)  RR: 18 (07 Sep 2023 11:34) (17 - 18)  SpO2: 96% (07 Sep 2023 11:34) (95% - 98%)  I&O's Summary    07 Sep 2023 07:01  -  07 Sep 2023 15:27  --------------------------------------------------------  IN: 100 mL / OUT: 400 mL / NET: -300 mL    PHYSICAL EXAM:  GENERAL: Pleasant elderly female laying comfortably in bed, NAD  HEENT: NCAT  CHEST/LUNG: Clear to percussion bilaterally; No rales, rhonchi, wheezing  HEART: Regular rate and rhythm; No murmurs  ABDOMEN: Soft, Nontender, Nondistended; Bowel sounds present  MUSCULOSKELETAL/EXTREMITIES:  2+ Peripheral Pulses, LLE cast in place, sensation intact  PSYCH: Appropriate affect, Alert & Oriented    LABS:                        11.9   7.91  )-----------( 230      ( 06 Sep 2023 16:20 )             36.4       09-07    142  |  108  |  24  ----------------------------<  105  4.1   |  26  |  1.28    Ca    9.8      07 Sep 2023 06:50  Mg     1.3     09-07    TPro  6.8  /  Alb  3.2  /  TBili  0.7  /  DBili  x   /  AST  18  /  ALT  16  /  AlkPhos  94  09-06    TSH 2.599   TSH with FT4 reflex --  Total T3 --    Urinalysis Basic - ( 07 Sep 2023 06:50 )    Color: x / Appearance: x / SG: x / pH: x  Gluc: 105 mg/dL / Ketone: x  / Bili: x / Urobili: x   Blood: x / Protein: x / Nitrite: x   Leuk Esterase: x / RBC: x / WBC x   Sq Epi: x / Non Sq Epi: x / Bacteria: x      COVID-19 PCR: Detected (09-06-23 @ 16:20)    Care Discussed with Consultants/Other Providers: Yes    CTH (9/6)  IMPRESSION: No acute intracranial hemorrhage, mass effect, or shift of   the midline structures.    Imaging findings for which normal pressure hydrocephalus can be   considered. Clinical correlation is required for this diagnosis.    Mild chronic white matter microvascular type changes.    Chronic pansinusitis.    < end of copied text >    CXR (9/6)  IMPRESSION:    No acute pulmonary disease.    Xray Left Ankle/Foot (9/6)  Moderate swelling centered at the ankle.  Displaced oblique fracture of the distal fibula/lateral malleolus    Diagnosis Line Normal sinus rhythm  Moderate voltage criteria for LVH, may be normal variant  Borderline ECG  No previous ECGs available  Confirmed by TEMI NELSON, TANVI BARRERA ([id number]) on 9/7/2023 8:20:01 AM

## 2023-09-07 NOTE — GOALS OF CARE CONVERSATION - ADVANCED CARE PLANNING - CONVERSATION DETAILS
Met with patient and GAYE Hickey at bedside. Pt. awake and alert to person, place. GAYE Hickey stated patient's son  Guillermo Karynasteve is the HCP.  Pt. has hx. dementia. Was COVID+ last week, she came to stay at her son Jeffery and GAYE Hickey's house, but fell. Now has right oblique fx. of distal fibula.   Son Guillermo states his mother lives alone. Able to do ADL herself. He visits every week with groceries. She can use her cell phone.  Son states that her quality of life is good and she should have attempted resuscitation and intubation in the event of cardiac arrest; Full Code.

## 2023-09-07 NOTE — PATIENT PROFILE ADULT - FALL HARM RISK - RISK INTERVENTIONS

## 2023-09-08 LAB
ANION GAP SERPL CALC-SCNC: 7 MMOL/L — SIGNIFICANT CHANGE UP (ref 5–17)
BUN SERPL-MCNC: 24 MG/DL — HIGH (ref 7–23)
CALCIUM SERPL-MCNC: 9.8 MG/DL — SIGNIFICANT CHANGE UP (ref 8.4–10.5)
CHLORIDE SERPL-SCNC: 108 MMOL/L — SIGNIFICANT CHANGE UP (ref 96–108)
CO2 SERPL-SCNC: 26 MMOL/L — SIGNIFICANT CHANGE UP (ref 22–31)
CREAT SERPL-MCNC: 1.35 MG/DL — HIGH (ref 0.5–1.3)
CULTURE RESULTS: SIGNIFICANT CHANGE UP
EGFR: 38 ML/MIN/1.73M2 — LOW
GLUCOSE SERPL-MCNC: 105 MG/DL — HIGH (ref 70–99)
HCT VFR BLD CALC: 35.4 % — SIGNIFICANT CHANGE UP (ref 34.5–45)
HGB BLD-MCNC: 11.9 G/DL — SIGNIFICANT CHANGE UP (ref 11.5–15.5)
MAGNESIUM SERPL-MCNC: 1.6 MG/DL — SIGNIFICANT CHANGE UP (ref 1.6–2.6)
MCHC RBC-ENTMCNC: 29.8 PG — SIGNIFICANT CHANGE UP (ref 27–34)
MCHC RBC-ENTMCNC: 33.6 GM/DL — SIGNIFICANT CHANGE UP (ref 32–36)
MCV RBC AUTO: 88.5 FL — SIGNIFICANT CHANGE UP (ref 80–100)
NRBC # BLD: 0 /100 WBCS — SIGNIFICANT CHANGE UP (ref 0–0)
PLATELET # BLD AUTO: 224 K/UL — SIGNIFICANT CHANGE UP (ref 150–400)
POTASSIUM SERPL-MCNC: 4.8 MMOL/L — SIGNIFICANT CHANGE UP (ref 3.5–5.3)
POTASSIUM SERPL-SCNC: 4.8 MMOL/L — SIGNIFICANT CHANGE UP (ref 3.5–5.3)
RBC # BLD: 4 M/UL — SIGNIFICANT CHANGE UP (ref 3.8–5.2)
RBC # FLD: 12.4 % — SIGNIFICANT CHANGE UP (ref 10.3–14.5)
SODIUM SERPL-SCNC: 141 MMOL/L — SIGNIFICANT CHANGE UP (ref 135–145)
SPECIMEN SOURCE: SIGNIFICANT CHANGE UP
WBC # BLD: 8.83 K/UL — SIGNIFICANT CHANGE UP (ref 3.8–10.5)
WBC # FLD AUTO: 8.83 K/UL — SIGNIFICANT CHANGE UP (ref 3.8–10.5)

## 2023-09-08 PROCEDURE — 99232 SBSQ HOSP IP/OBS MODERATE 35: CPT

## 2023-09-08 RX ORDER — MAGNESIUM OXIDE 400 MG ORAL TABLET 241.3 MG
400 TABLET ORAL ONCE
Refills: 0 | Status: DISCONTINUED | OUTPATIENT
Start: 2023-09-08 | End: 2023-09-08

## 2023-09-08 RX ORDER — HYDROCORTISONE 1 %
1 OINTMENT (GRAM) TOPICAL
Refills: 0 | Status: COMPLETED | OUTPATIENT
Start: 2023-09-08 | End: 2023-09-13

## 2023-09-08 RX ADMIN — Medication 1 APPLICATION(S): at 14:17

## 2023-09-08 RX ADMIN — NEBIVOLOL HYDROCHLORIDE 5 MILLIGRAM(S): 5 TABLET ORAL at 06:00

## 2023-09-08 RX ADMIN — ATORVASTATIN CALCIUM 10 MILLIGRAM(S): 80 TABLET, FILM COATED ORAL at 21:52

## 2023-09-08 RX ADMIN — ENOXAPARIN SODIUM 40 MILLIGRAM(S): 100 INJECTION SUBCUTANEOUS at 06:00

## 2023-09-08 RX ADMIN — Medication 1 APPLICATION(S): at 18:01

## 2023-09-08 NOTE — PROGRESS NOTE ADULT - ASSESSMENT
88 year old female with hx of htn, hld, chronic CKD presents s/p fall with Left ankle fracture .   Dr Ceballos saw patient and family today and explained risks and benefits but states that this is a stable fracture   Moderate swelling centered at the ankle. Displaced oblique   fracture of the distal fibula/lateral malleolus. No other acute fracture   or dislocation. Ankle mortise intact.    PLan:   WBAT left ankle with cam walker             Elevate left ankle while in bed             ice pack             DVT prophylaxis            pain management             no surgical intervention needed

## 2023-09-08 NOTE — PROGRESS NOTE ADULT - SUBJECTIVE AND OBJECTIVE BOX
Patient is a 88y old  Female who presents with a chief complaint of fall (08 Sep 2023 16:15)  Admitted and w/u found to have Left ankle fx     Patient seen and examined at bedside. No overnight events noted.  This morning patient reports feeling well, denies pain.  Discussed with medical  team, no plan for surgical intervention.     Dr Ceballos saw patient this am and discussed plan with family     ALLERGIES:  chocolate (Hives)  sulfa drugs (Unknown)    MEDICATIONS  (STANDING):  atorvastatin 10 milliGRAM(s) Oral at bedtime  enoxaparin Injectable 40 milliGRAM(s) SubCutaneous every 24 hours  hydrocortisone 2.5% Lotion 1 Application(s) Topical two times a day  nebivolol 5 milliGRAM(s) Oral daily    MEDICATIONS  (PRN):  acetaminophen     Tablet .. 650 milliGRAM(s) Oral every 6 hours PRN Temp greater or equal to 38C (100.4F), Mild Pain (1 - 3)  aluminum hydroxide/magnesium hydroxide/simethicone Suspension 30 milliLiter(s) Oral every 4 hours PRN Dyspepsia  melatonin 3 milliGRAM(s) Oral at bedtime PRN Insomnia  ondansetron Injectable 4 milliGRAM(s) IV Push every 8 hours PRN Nausea and/or Vomiting  oxyCODONE    IR 5 milliGRAM(s) Oral every 6 hours PRN Severe Pain (7 - 10)    Vital Signs Last 24 Hrs  T(F): 97.7 (08 Sep 2023 14:34), Max: 98.7 (07 Sep 2023 20:54)  HR: 83 (08 Sep 2023 14:34) (77 - 83)  BP: 130/72 (08 Sep 2023 14:34) (130/72 - 173/70)  RR: 18 (08 Sep 2023 14:34) (18 - 18)  SpO2: 95% (08 Sep 2023 14:34) (95% - 98%)  I&O's Summary    07 Sep 2023 07:01  -  08 Sep 2023 07:00  --------------------------------------------------------  IN: 225 mL / OUT: 700 mL / NET: -475 mL    08 Sep 2023 07:01  -  08 Sep 2023 17:20  --------------------------------------------------------  IN: 200 mL / OUT: 300 mL / NET: -100 mL      PHYSICAL EXAM:  General: NAD, A/O x 3  ENT: MMM  Neck: Supple, No JVD  Lungs: Clear to auscultation bilaterally  Cardio: RRR, S1/S2,   Abdomen: Soft, Nontender, Nondistended; Bowel sounds present  Extremities:   Left ankle non displaced fracture     LABS:                        11.9   8.83  )-----------( 224      ( 08 Sep 2023 07:30 )             35.4     09-08    141  |  108  |  24  ----------------------------<  105  4.8   |  26  |  1.35    Ca    9.8      08 Sep 2023 07:30  Mg     1.6     09-08    TPro  6.8  /  Alb  3.2  /  TBili  0.7  /  DBili  x   /  AST  18  /  ALT  16  /  AlkPhos  94  09-06      TSH 2.599   TSH with FT4 reflex --  Total T3 -      Urinalysis Basic - ( 08 Sep 2023 07:30 )    Color: x / Appearance: x / SG: x / pH: x  Gluc: 105 mg/dL / Ketone: x  / Bili: x / Urobili: x   Blood: x / Protein: x / Nitrite: x   Leuk Esterase: x / RBC: x / WBC x   Sq Epi: x / Non Sq Epi: x / Bacteria: x    COVID-19 PCR: Detected (09-06-23 @ 16:20)      RADIOLOGY & ADDITIONAL TESTS:    Care Discussed with Consultants/Other Providers:

## 2023-09-08 NOTE — PROGRESS NOTE ADULT - SUBJECTIVE AND OBJECTIVE BOX
Interval History  Patient seen and examined at bedside. No overnight events noted.  This morning patient reports feeling well, denies pain.  Discussed with surgery team, no plan for surgical intervention.     ALLERGIES:  chocolate (Hives)  sulfa drugs (Unknown)    MEDICATIONS  (STANDING):  atorvastatin 10 milliGRAM(s) Oral at bedtime  enoxaparin Injectable 40 milliGRAM(s) SubCutaneous every 24 hours  hydrocortisone 2.5% Lotion 1 Application(s) Topical two times a day  nebivolol 5 milliGRAM(s) Oral daily    MEDICATIONS  (PRN):  acetaminophen     Tablet .. 650 milliGRAM(s) Oral every 6 hours PRN Temp greater or equal to 38C (100.4F), Mild Pain (1 - 3)  aluminum hydroxide/magnesium hydroxide/simethicone Suspension 30 milliLiter(s) Oral every 4 hours PRN Dyspepsia  melatonin 3 milliGRAM(s) Oral at bedtime PRN Insomnia  ondansetron Injectable 4 milliGRAM(s) IV Push every 8 hours PRN Nausea and/or Vomiting  oxyCODONE    IR 5 milliGRAM(s) Oral every 6 hours PRN Severe Pain (7 - 10)    Vital Signs Last 24 Hrs  T(F): 97.7 (08 Sep 2023 14:34), Max: 98.7 (07 Sep 2023 20:54)  HR: 83 (08 Sep 2023 14:34) (77 - 83)  BP: 130/72 (08 Sep 2023 14:34) (130/72 - 173/70)  RR: 18 (08 Sep 2023 14:34) (18 - 18)  SpO2: 95% (08 Sep 2023 14:34) (95% - 98%)  I&O's Summary    07 Sep 2023 07:01  -  08 Sep 2023 07:00  --------------------------------------------------------  IN: 225 mL / OUT: 700 mL / NET: -475 mL    08 Sep 2023 07:01  -  08 Sep 2023 16:16  --------------------------------------------------------  IN: 200 mL / OUT: 300 mL / NET: -100 mL    PHYSICAL EXAM:  GENERAL: Pleasant elderly female laying comfortably in ed  HEENT: NCAT  CHEST/LUNG: Clear to percussion bilaterally; No rales, rhonchi, wheezing  HEART: Regular rate and rhythm; No murmurs  ABDOMEN: Soft, Nontender, Nondistended; Bowel sounds present  MUSCULOSKELETAL/EXTREMITIES:  Left ankle cast in place  PSYCH: Appropriate affec  NEURO: Non-focal    LABS:                        11.9   8.83  )-----------( 224      ( 08 Sep 2023 07:30 )             35.4       09-08    141  |  108  |  24  ----------------------------<  105  4.8   |  26  |  1.35    Ca    9.8      08 Sep 2023 07:30  Mg     1.6     09-08    TPro  6.8  /  Alb  3.2  /  TBili  0.7  /  DBili  x   /  AST  18  /  ALT  16  /  AlkPhos  94  09-06     TSH 2.599   TSH with FT4 reflex --  Total T3 --    Urinalysis Basic - ( 08 Sep 2023 07:30 )    Color: x / Appearance: x / SG: x / pH: x  Gluc: 105 mg/dL / Ketone: x  / Bili: x / Urobili: x   Blood: x / Protein: x / Nitrite: x   Leuk Esterase: x / RBC: x / WBC x   Sq Epi: x / Non Sq Epi: x / Bacteria: x    COVID-19 PCR: Detected (09-06-23 @ 16:20)      Care Discused with Consultants/Other Providers: Yes

## 2023-09-09 LAB
ANION GAP SERPL CALC-SCNC: 9 MMOL/L — SIGNIFICANT CHANGE UP (ref 5–17)
BUN SERPL-MCNC: 28 MG/DL — HIGH (ref 7–23)
CALCIUM SERPL-MCNC: 9.7 MG/DL — SIGNIFICANT CHANGE UP (ref 8.4–10.5)
CHLORIDE SERPL-SCNC: 109 MMOL/L — HIGH (ref 96–108)
CO2 SERPL-SCNC: 20 MMOL/L — LOW (ref 22–31)
CREAT SERPL-MCNC: 1.24 MG/DL — SIGNIFICANT CHANGE UP (ref 0.5–1.3)
EGFR: 42 ML/MIN/1.73M2 — LOW
GLUCOSE SERPL-MCNC: 99 MG/DL — SIGNIFICANT CHANGE UP (ref 70–99)
MAGNESIUM SERPL-MCNC: 1.4 MG/DL — LOW (ref 1.6–2.6)
POTASSIUM SERPL-MCNC: 5 MMOL/L — SIGNIFICANT CHANGE UP (ref 3.5–5.3)
POTASSIUM SERPL-SCNC: 5 MMOL/L — SIGNIFICANT CHANGE UP (ref 3.5–5.3)
SARS-COV-2 RNA SPEC QL NAA+PROBE: DETECTED
SODIUM SERPL-SCNC: 138 MMOL/L — SIGNIFICANT CHANGE UP (ref 135–145)

## 2023-09-09 PROCEDURE — 99232 SBSQ HOSP IP/OBS MODERATE 35: CPT

## 2023-09-09 RX ORDER — MAGNESIUM OXIDE 400 MG ORAL TABLET 241.3 MG
400 TABLET ORAL ONCE
Refills: 0 | Status: COMPLETED | OUTPATIENT
Start: 2023-09-09 | End: 2023-09-09

## 2023-09-09 RX ADMIN — Medication 650 MILLIGRAM(S): at 05:32

## 2023-09-09 RX ADMIN — ENOXAPARIN SODIUM 40 MILLIGRAM(S): 100 INJECTION SUBCUTANEOUS at 05:47

## 2023-09-09 RX ADMIN — NEBIVOLOL HYDROCHLORIDE 5 MILLIGRAM(S): 5 TABLET ORAL at 05:32

## 2023-09-09 RX ADMIN — Medication 1 APPLICATION(S): at 05:32

## 2023-09-09 RX ADMIN — MAGNESIUM OXIDE 400 MG ORAL TABLET 400 MILLIGRAM(S): 241.3 TABLET ORAL at 15:36

## 2023-09-09 RX ADMIN — ATORVASTATIN CALCIUM 10 MILLIGRAM(S): 80 TABLET, FILM COATED ORAL at 21:56

## 2023-09-09 RX ADMIN — Medication 1 APPLICATION(S): at 18:13

## 2023-09-09 RX ADMIN — Medication 650 MILLIGRAM(S): at 06:02

## 2023-09-09 NOTE — PROGRESS NOTE ADULT - ASSESSMENT
87 yo F from home with PMH of CKD, dementia, recent COVID + 8/28 s/p paxlovid, presenting with s/p fall with left ankle pain found to have acute left distal fibula/lateral malleolus fracture. She was seen by orthopedic who recommended conservative management.     Left acute distal fracture of fibula/lateral malleolus, s/p fall  Gait imbalance  -Ortho consult appreciated, recommended conservative management  -WBAT with CAM boot   -Elevate LLE, ice packs   -Pain medication PRN  -PT eval pending     Recent COVID  -Diagnosed 8/28 by [facility], completed paxlovid  -HERE, still testing + for COVID; asymptomatic  -Will repeat PCR   -Will discuss with infection control regarding need for further isolation on Monday     Hypomag   -Repleted  -Monitor level    HTN  HLD  -Continue bystolic and atorvastatin   -Monitor BP - improved    Hx of Dementia  -Will monitor, not on medications  -B12/folate/TSH/FT4 WNL  -Fall precautions    #Hx of CKD  -Cr 1.4 range in 1/2023, at baseline  -Avoid nephrotoxic agents   -Monitor creatinine    DVT PPx  -Lovenox SC    DISP: From home, pending PT eval    Spoke with Daughter Edelmira at bedside; answered all questions, in agreement with care plan.

## 2023-09-09 NOTE — PHYSICAL THERAPY INITIAL EVALUATION ADULT - SHORT TERM MEMORY, REHAB EVAL
Hide Neutrogena Products: No
Action 3: Continue
Detail Level: Zone
Continue Regimen: Epiduo
Show Cerave Line: Yes
Start Regimen: TAC
impaired

## 2023-09-09 NOTE — PHYSICAL THERAPY INITIAL EVALUATION ADULT - PERTINENT HX OF CURRENT PROBLEM, REHAB EVAL
87 yo F from home with PMH of CKD, dementia, recent COVID + 8/28 s/p paxlovid, presenting with s/p fall with left ankle pain found to have acute left distal fibula/lateral malleolus fracture. She was seen by orthopedic who recommended conservative management.     Left acute distal fracture of fibula/lateral malleolus, s/p fall  Gait imbalance  -Ortho consult appreciated, recommended conservative management  -WBAT with CAM boot   -Elevate LLE, ice packs   -Pain medication PRN  -PT eval pending

## 2023-09-09 NOTE — PROGRESS NOTE ADULT - ASSESSMENT
· Assessment	  88 year old female with hx of htn, hld, chronic CKD presents s/p fall with Left ankle fracture  Discussed with Dr Ceballos who saw patient yesterday and spoke to family members.   -- stable non displaced left ankle fracture     PLan:   WBAT  left ankle with cam boot             Splint removed             Elevate left ankle when in bed             ice pack as needed             DVT prophylaxis             Conservative treatment in cam boot for 6 weeks - follow up with Dr Ceballos in 2 weeks              pain management                       Electronic Signatures:  Abiola Shell)   (Signed 07-Sep-2023 18:15)  	Authored: Consult Note, Referral/Consultation, Subjective and Objective, Assessment and Recommendation  Reuben Ceballos)   (Signed 07-Sep-2023 18:36)  	Co-Signer: Consult Note, Referral/Consultation, Subjective and Objective, Assessment and Recommendation    Last Updated: 07-Sep-2023 18:36 by Reuben Ceballos)

## 2023-09-09 NOTE — PROGRESS NOTE ADULT - SUBJECTIVE AND OBJECTIVE BOX
Interval History  Patient seen and examined at bedside. No overnight events.  Patient with no acute complaints this morning. No pain.   WBAT with CAM (ordered) per orthopedics. PT consult pending.    ALLERGIES:  chocolate (Hives)  sulfa drugs (Unknown)    MEDICATIONS  (STANDING):  atorvastatin 10 milliGRAM(s) Oral at bedtime  enoxaparin Injectable 40 milliGRAM(s) SubCutaneous every 24 hours  hydrocortisone 2.5% Lotion 1 Application(s) Topical two times a day  nebivolol 5 milliGRAM(s) Oral daily    MEDICATIONS  (PRN):  acetaminophen     Tablet .. 650 milliGRAM(s) Oral every 6 hours PRN Temp greater or equal to 38C (100.4F), Mild Pain (1 - 3)  aluminum hydroxide/magnesium hydroxide/simethicone Suspension 30 milliLiter(s) Oral every 4 hours PRN Dyspepsia  melatonin 3 milliGRAM(s) Oral at bedtime PRN Insomnia  ondansetron Injectable 4 milliGRAM(s) IV Push every 8 hours PRN Nausea and/or Vomiting  oxyCODONE    IR 5 milliGRAM(s) Oral every 6 hours PRN Severe Pain (7 - 10)    Vital Signs Last 24 Hrs  T(F): 97 (09 Sep 2023 12:28), Max: 98.5 (08 Sep 2023 20:16)  HR: 79 (09 Sep 2023 12:28) (66 - 83)  BP: 144/75 (09 Sep 2023 12:28) (130/72 - 186/82)  RR: 18 (09 Sep 2023 12:28) (18 - 18)  SpO2: 98% (09 Sep 2023 12:28) (95% - 98%)  I&O's Summary    08 Sep 2023 07:01  -  09 Sep 2023 07:00  --------------------------------------------------------  IN: 400 mL / OUT: 1050 mL / NET: -650 mL    PHYSICAL EXAM:  GENERAL: NAD  HEENT: NCAT  CHEST/LUNG: Clear to percussion bilaterally; No rales, rhonchi, wheezing  HEART: Regular rate and rhythm; No murmurs  ABDOMEN: Soft, Nontender, Nondistended; Bowel sounds present  MUSCULOSKELETAL/EXTREMITIES:  LLE splint in place, +pulses, sensation intact   PSYCH: Appropriate affect  NEURO: Alert, confused, non-focal     LABS:                        11.9   8.83  )-----------( 224      ( 08 Sep 2023 07:30 )             35.4       09-09    138  |  109  |  28  ----------------------------<  99  5.0   |  20  |  1.24    Ca    9.7      09 Sep 2023 06:00  Mg     1.4     09-09    TPro  6.8  /  Alb  3.2  /  TBili  0.7  /  DBili  x   /  AST  18  /  ALT  16  /  AlkPhos  94  09-06     TSH 2.599   TSH with FT4 reflex --  Total T3 --    Urinalysis Basic - ( 09 Sep 2023 06:00 )    Color: x / Appearance: x / SG: x / pH: x  Gluc: 99 mg/dL / Ketone: x  / Bili: x / Urobili: x   Blood: x / Protein: x / Nitrite: x   Leuk Esterase: x / RBC: x / WBC x   Sq Epi: x / Non Sq Epi: x / Bacteria: x    Culture - Urine (collected 06 Sep 2023 22:00)  Source: Clean Catch Clean Catch (Midstream)  Final Report (08 Sep 2023 23:57):    <10,000 CFU/mL Normal Urogenital Jeanie    COVID-19 PCR: Detected (09-06-23 @ 16:20)    Care Discussed with Consultants/Other Providers: Yes

## 2023-09-09 NOTE — PROGRESS NOTE ADULT - SUBJECTIVE AND OBJECTIVE BOX
Patient is a 88y old  Female who presents with a chief complaint of Fall, distal fibula/lateral malleolus fractures admitted on 09/06.  Patient seen and examined at bedside today daughter by her side.   Patient is without complaints , denies pain while in bed.    Srini Roa orthotist delivered cam walker this am.    I removed splint at bedside, cam walker placed .    Attempted to get patient to stand with walker and cam boot with EDGARD Weber.   Patient was able to stand for a few seconds and felt dizzy and weak .   Placed back in bed.    Patient denies any CP or SOB.    ALLERGIES:  chocolate (Hives)  sulfa drugs (Unknown)    MEDICATIONS  (STANDING):  atorvastatin 10 milliGRAM(s) Oral at bedtime  enoxaparin Injectable 40 milliGRAM(s) SubCutaneous every 24 hours  hydrocortisone 2.5% Lotion 1 Application(s) Topical two times a day  nebivolol 5 milliGRAM(s) Oral daily    MEDICATIONS  (PRN):  acetaminophen     Tablet .. 650 milliGRAM(s) Oral every 6 hours PRN Temp greater or equal to 38C (100.4F), Mild Pain (1 - 3)  aluminum hydroxide/magnesium hydroxide/simethicone Suspension 30 milliLiter(s) Oral every 4 hours PRN Dyspepsia  melatonin 3 milliGRAM(s) Oral at bedtime PRN Insomnia  ondansetron Injectable 4 milliGRAM(s) IV Push every 8 hours PRN Nausea and/or Vomiting  oxyCODONE    IR 5 milliGRAM(s) Oral every 6 hours PRN Severe Pain (7 - 10)    Vital Signs Last 24 Hrs  T(F): 97 (09 Sep 2023 12:28), Max: 98.5 (08 Sep 2023 20:16)  HR: 79 (09 Sep 2023 12:28) (66 - 79)  BP: 144/75 (09 Sep 2023 12:28) (144/75 - 186/82)  RR: 18 (09 Sep 2023 12:28) (18 - 18)  SpO2: 98% (09 Sep 2023 12:28) (97% - 98%)  I&O's Summary    08 Sep 2023 07:01  -  09 Sep 2023 07:00  --------------------------------------------------------  IN: 400 mL / OUT: 1050 mL / NET: -650 mL    09 Sep 2023 07:01  -  09 Sep 2023 17:54  --------------------------------------------------------  IN: 200 mL / OUT: 400 mL / NET: -200 mL      PHYSICAL EXAM:  General: NAD, A/O x 3  ENT: MMM  Neck: Supple, No JVD  Lungs: Clear to auscultation bilaterally  Cardio: RRR, S1/S2, No murmurs  Abdomen: Soft, Nontender, Nondistended; Bowel sounds present  Extremities: Left ankle splint removed.,.. swelling improved minimum ecchymosis on lateral side.    good DP pulses , no break in skin , poor nail hygiene, thickened fungal nails.  Cam boot applied., patient feels comfortable .    LABS:                        11.9   8.83  )-----------( 224      ( 08 Sep 2023 07:30 )             35.4     09-09    138  |  109  |  28  ----------------------------<  99  5.0   |  20  |  1.24    Ca    9.7      09 Sep 2023 06:00  Mg     1.4     09-09        TSH 2.599   TSH with FT4 reflex --  Total T3 --    Urinalysis Basic - ( 09 Sep 2023 06:00 )    Color: x / Appearance: x / SG: x / pH: x  Gluc: 99 mg/dL / Ketone: x  / Bili: x / Urobili: x   Blood: x / Protein: x / Nitrite: x   Leuk Esterase: x / RBC: x / WBC x   Sq Epi: x / Non Sq Epi: x / Bacteria: x        Culture - Urine (collected 06 Sep 2023 22:00)  Source: Clean Catch Clean Catch (Midstream)  Final Report (08 Sep 2023 23:57):    <10,000 CFU/mL Normal Urogenital Jeanie      COVID-19 PCR: Detected (09-09-23 @ 14:00)  COVID-19 PCR: Detected (09-06-23 @ 16:20)      RADIOLOGY & ADDITIONAL TESTS:    Care Discussed with Consultants/Other Providers:

## 2023-09-10 LAB
ANION GAP SERPL CALC-SCNC: 6 MMOL/L — SIGNIFICANT CHANGE UP (ref 5–17)
BUN SERPL-MCNC: 31 MG/DL — HIGH (ref 7–23)
CALCIUM SERPL-MCNC: 9.7 MG/DL — SIGNIFICANT CHANGE UP (ref 8.4–10.5)
CHLORIDE SERPL-SCNC: 106 MMOL/L — SIGNIFICANT CHANGE UP (ref 96–108)
CO2 SERPL-SCNC: 26 MMOL/L — SIGNIFICANT CHANGE UP (ref 22–31)
CREAT SERPL-MCNC: 1.46 MG/DL — HIGH (ref 0.5–1.3)
EGFR: 35 ML/MIN/1.73M2 — LOW
GLUCOSE SERPL-MCNC: 96 MG/DL — SIGNIFICANT CHANGE UP (ref 70–99)
MAGNESIUM SERPL-MCNC: 1.6 MG/DL — SIGNIFICANT CHANGE UP (ref 1.6–2.6)
POTASSIUM SERPL-MCNC: 5 MMOL/L — SIGNIFICANT CHANGE UP (ref 3.5–5.3)
POTASSIUM SERPL-SCNC: 5 MMOL/L — SIGNIFICANT CHANGE UP (ref 3.5–5.3)
SODIUM SERPL-SCNC: 138 MMOL/L — SIGNIFICANT CHANGE UP (ref 135–145)

## 2023-09-10 PROCEDURE — 99232 SBSQ HOSP IP/OBS MODERATE 35: CPT

## 2023-09-10 RX ORDER — MAGNESIUM OXIDE 400 MG ORAL TABLET 241.3 MG
400 TABLET ORAL ONCE
Refills: 0 | Status: COMPLETED | OUTPATIENT
Start: 2023-09-10 | End: 2023-09-10

## 2023-09-10 RX ADMIN — ATORVASTATIN CALCIUM 10 MILLIGRAM(S): 80 TABLET, FILM COATED ORAL at 21:49

## 2023-09-10 RX ADMIN — MAGNESIUM OXIDE 400 MG ORAL TABLET 400 MILLIGRAM(S): 241.3 TABLET ORAL at 17:41

## 2023-09-10 RX ADMIN — Medication 1 APPLICATION(S): at 06:32

## 2023-09-10 RX ADMIN — Medication 650 MILLIGRAM(S): at 21:49

## 2023-09-10 RX ADMIN — NEBIVOLOL HYDROCHLORIDE 5 MILLIGRAM(S): 5 TABLET ORAL at 06:31

## 2023-09-10 RX ADMIN — Medication 1 APPLICATION(S): at 17:41

## 2023-09-10 RX ADMIN — ENOXAPARIN SODIUM 40 MILLIGRAM(S): 100 INJECTION SUBCUTANEOUS at 06:32

## 2023-09-10 RX ADMIN — Medication 650 MILLIGRAM(S): at 22:19

## 2023-09-10 NOTE — PROGRESS NOTE ADULT - SUBJECTIVE AND OBJECTIVE BOX
Interval History  Patient seen and examined at bedside. No overnight event.  This morning patient is at her baseline, she denies any acute complaints, no pain.  PT consult appreciated, recommended acute rehab     ALLERGIES:  chocolate (Hives)  sulfa drugs (Unknown)    MEDICATIONS  (STANDING):  atorvastatin 10 milliGRAM(s) Oral at bedtime  enoxaparin Injectable 40 milliGRAM(s) SubCutaneous every 24 hours  hydrocortisone 2.5% Lotion 1 Application(s) Topical two times a day  nebivolol 5 milliGRAM(s) Oral daily    MEDICATIONS  (PRN):  acetaminophen     Tablet .. 650 milliGRAM(s) Oral every 6 hours PRN Temp greater or equal to 38C (100.4F), Mild Pain (1 - 3)  aluminum hydroxide/magnesium hydroxide/simethicone Suspension 30 milliLiter(s) Oral every 4 hours PRN Dyspepsia  melatonin 3 milliGRAM(s) Oral at bedtime PRN Insomnia  ondansetron Injectable 4 milliGRAM(s) IV Push every 8 hours PRN Nausea and/or Vomiting  oxyCODONE    IR 5 milliGRAM(s) Oral every 6 hours PRN Severe Pain (7 - 10)    Vital Signs Last 24 Hrs  T(F): 98.2 (10 Sep 2023 12:08), Max: 98.2 (10 Sep 2023 05:41)  HR: 80 (10 Sep 2023 12:08) (74 - 82)  BP: 140/70 (10 Sep 2023 12:08) (140/70 - 167/71)  RR: 18 (10 Sep 2023 12:08) (17 - 18)  SpO2: 98% (10 Sep 2023 12:08) (96% - 98%)  I&O's Summary    09 Sep 2023 07:01  -  10 Sep 2023 07:00  --------------------------------------------------------  IN: 200 mL / OUT: 1500 mL / NET: -1300 mL    10 Sep 2023 07:01  -  10 Sep 2023 16:13  --------------------------------------------------------  IN: 200 mL / OUT: 500 mL / NET: -300 mL    PHYSICAL EXAM:  GENERAL: NAD  HEENT: NCAT  CHEST/LUNG: Clear to percussion bilaterally; No rales, rhonchi, wheezing  HEART: Regular rate and rhythm; No murmurs  ABDOMEN: Soft, Nontender, Nondistended; Bowel sounds present  MUSCULOSKELETAL/EXTREMITIES:  LLE CAM boot in place, +pulses, sensation intact  PSYCH: Appropriate affect  NEURO: Alert & Oriented x 1, confused at baseline    LABS:                        11.9   8.83  )-----------( 224      ( 08 Sep 2023 07:30 )             35.4       09-10    138  |  106  |  31  ----------------------------<  96  5.0   |  26  |  1.46    Ca    9.7      10 Sep 2023 06:21  Mg     1.6     09-10    Urinalysis Basic - ( 10 Sep 2023 06:21 )    Color: x / Appearance: x / SG: x / pH: x  Gluc: 96 mg/dL / Ketone: x  / Bili: x / Urobili: x   Blood: x / Protein: x / Nitrite: x   Leuk Esterase: x / RBC: x / WBC x   Sq Epi: x / Non Sq Epi: x / Bacteria: x    Culture - Urine (collected 06 Sep 2023 22:00)  Source: Clean Catch Clean Catch (Midstream)  Final Report (08 Sep 2023 23:57):    <10,000 CFU/mL Normal Urogenital Jeanie    COVID-19 PCR: Detected (09-09-23 @ 14:00)  COVID-19 PCR: Detected (09-06-23 @ 16:20)

## 2023-09-10 NOTE — PROGRESS NOTE ADULT - ASSESSMENT
87 yo F from home with PMH of CKD, dementia, recent COVID + 8/28 s/p paxlovid, presenting with s/p fall with left ankle pain found to have acute left distal fibula/lateral malleolus fracture. She was seen by orthopedic who recommended conservative management.     Left acute distal fracture of fibula/lateral malleolus, s/p fall  Gait imbalance  -Ortho consult appreciated, recommended conservative management  -WBAT with CAM boot   -Elevate LLE, ice packs   -Pain medication PRN  -PT eval appreciated, recommended acute rehab  -PM&R and OT consult placed     Recent COVID  -Diagnosed 8/28 by [facility], completed paxlovid  -HERE, still testing + for COVID; asymptomatic  -Repeat PCR 9/9 - positive  -Will discuss with infection control regarding need for further isolation on Monday     Hypomag   -Repleted  -Monitor level    HTN  HLD  -Continue bystolic and atorvastatin   -Monitor BP - improved    Hx of Dementia  -Will monitor, not on medications  -B12/folate/TSH/FT4 WNL  -Fall precautions    #Hx of CKD  -Cr 1.4 range in 1/2023, at baseline  -Avoid nephrotoxic agents   -Monitor creatinine    DVT PPx  -Lovenox SC    DISPO  -Acute Rehab pending PM&R and OT eval    Spoke with Daughter Edelmira over the phone; answered all questions, in agreement with care plan.

## 2023-09-11 LAB
ANION GAP SERPL CALC-SCNC: 8 MMOL/L — SIGNIFICANT CHANGE UP (ref 5–17)
BUN SERPL-MCNC: 40 MG/DL — HIGH (ref 7–23)
CALCIUM SERPL-MCNC: 10.4 MG/DL — SIGNIFICANT CHANGE UP (ref 8.4–10.5)
CHLORIDE SERPL-SCNC: 107 MMOL/L — SIGNIFICANT CHANGE UP (ref 96–108)
CO2 SERPL-SCNC: 24 MMOL/L — SIGNIFICANT CHANGE UP (ref 22–31)
CREAT SERPL-MCNC: 1.2 MG/DL — SIGNIFICANT CHANGE UP (ref 0.5–1.3)
EGFR: 44 ML/MIN/1.73M2 — LOW
GLUCOSE SERPL-MCNC: 94 MG/DL — SIGNIFICANT CHANGE UP (ref 70–99)
MAGNESIUM SERPL-MCNC: 1.8 MG/DL — SIGNIFICANT CHANGE UP (ref 1.6–2.6)
POTASSIUM SERPL-MCNC: 5 MMOL/L — SIGNIFICANT CHANGE UP (ref 3.5–5.3)
POTASSIUM SERPL-SCNC: 5 MMOL/L — SIGNIFICANT CHANGE UP (ref 3.5–5.3)
SODIUM SERPL-SCNC: 139 MMOL/L — SIGNIFICANT CHANGE UP (ref 135–145)

## 2023-09-11 PROCEDURE — 99232 SBSQ HOSP IP/OBS MODERATE 35: CPT

## 2023-09-11 PROCEDURE — 99222 1ST HOSP IP/OBS MODERATE 55: CPT | Mod: GC

## 2023-09-11 RX ADMIN — Medication 1 APPLICATION(S): at 17:10

## 2023-09-11 RX ADMIN — NEBIVOLOL HYDROCHLORIDE 5 MILLIGRAM(S): 5 TABLET ORAL at 05:36

## 2023-09-11 RX ADMIN — ATORVASTATIN CALCIUM 10 MILLIGRAM(S): 80 TABLET, FILM COATED ORAL at 21:09

## 2023-09-11 RX ADMIN — ENOXAPARIN SODIUM 40 MILLIGRAM(S): 100 INJECTION SUBCUTANEOUS at 05:36

## 2023-09-11 RX ADMIN — Medication 1 APPLICATION(S): at 05:36

## 2023-09-11 NOTE — CONSULT NOTE ADULT - ASSESSMENT
Pt with non displaced ankle fracture   Given pt's prior functional status and living alone with stairs, she would be more appropriate for subacute rehab

## 2023-09-11 NOTE — PROGRESS NOTE ADULT - SUBJECTIVE AND OBJECTIVE BOX
Interval History  Patient seen and examined at bedside. No overnight event.  This morning patient is in good spirit, she is at her baseline mental status. She denies any acute complaints.  She was seen by PM&R, recommended [facility] instead.    ALLERGIES:  chocolate (Hives)  sulfa drugs (Unknown)    MEDICATIONS  (STANDING):  atorvastatin 10 milliGRAM(s) Oral at bedtime  enoxaparin Injectable 40 milliGRAM(s) SubCutaneous every 24 hours  hydrocortisone 2.5% Lotion 1 Application(s) Topical two times a day  nebivolol 5 milliGRAM(s) Oral daily    MEDICATIONS  (PRN):  acetaminophen     Tablet .. 650 milliGRAM(s) Oral every 6 hours PRN Temp greater or equal to 38C (100.4F), Mild Pain (1 - 3)  aluminum hydroxide/magnesium hydroxide/simethicone Suspension 30 milliLiter(s) Oral every 4 hours PRN Dyspepsia  melatonin 3 milliGRAM(s) Oral at bedtime PRN Insomnia  ondansetron Injectable 4 milliGRAM(s) IV Push every 8 hours PRN Nausea and/or Vomiting  oxyCODONE    IR 5 milliGRAM(s) Oral every 6 hours PRN Severe Pain (7 - 10)    Vital Signs Last 24 Hrs  T(F): 98.1 (11 Sep 2023 12:12), Max: 98.2 (11 Sep 2023 05:25)  HR: 79 (11 Sep 2023 12:12) (67 - 79)  BP: 143/81 (11 Sep 2023 12:12) (143/81 - 176/76)  RR: 18 (11 Sep 2023 12:12) (18 - 18)  SpO2: 95% (11 Sep 2023 12:12) (95% - 98%)  I&O's Summary    10 Sep 2023 07:01  -  11 Sep 2023 07:00  --------------------------------------------------------  IN: 200 mL / OUT: 900 mL / NET: -700 mL    11 Sep 2023 07:01  -  11 Sep 2023 16:12  --------------------------------------------------------  IN: 200 mL / OUT: 400 mL / NET: -200 mL    BMI (kg/m2): 29.9 (09-11-23 @ 13:27)    PHYSICAL EXAM:  GENERAL: NAD  HEENT: NCAT  CHEST/LUNG: Clear to percussion bilaterally; No rales, rhonchi, wheezing  HEART: Regular rate and rhythm; No murmurs  ABDOMEN: Soft, Nontender, Nondistended; Bowel sounds present  MUSCULOSKELETAL/EXTREMITIES:  LLE CAM boot in place, +pulses, sensation intact  PSYCH: Appropriate affect  NEURO: Alert & Oriented x 1, confused at baseline    LABS:      09-11    139  |  107  |  40  ----------------------------<  94  5.0   |  24  |  1.20    Ca    10.4      11 Sep 2023 07:24  Mg     1.8     09-11    Urinalysis Basic - ( 11 Sep 2023 07:24 )    Color: x / Appearance: x / SG: x / pH: x  Gluc: 94 mg/dL / Ketone: x  / Bili: x / Urobili: x   Blood: x / Protein: x / Nitrite: x   Leuk Esterase: x / RBC: x / WBC x   Sq Epi: x / Non Sq Epi: x / Bacteria: x    Culture - Urine (collected 06 Sep 2023 22:00)  Source: Clean Catch Clean Catch (Midstream)  Final Report (08 Sep 2023 23:57):    <10,000 CFU/mL Normal Urogenital Jeanie    COVID-19 PCR: Detected (09-09-23 @ 14:00)  COVID-19 PCR: Detected (09-06-23 @ 16:20)      Care Discussed with Consultants/Other Providers: Yes

## 2023-09-11 NOTE — DIETITIAN INITIAL EVALUATION ADULT - PERSON TAUGHT/METHOD
Optimal nutrition, low Na/verbal instruction/teach back - (Patient repeats in own words)/patient instructed/daughter instructed

## 2023-09-11 NOTE — OCCUPATIONAL THERAPY INITIAL EVALUATION ADULT - PHYSICAL ASSIST/NONPHYSICAL ASSIST:DRESS LOWER BODY, OT EVAL
"Subjective:      Patient ID: Nevin Liz is a 38 y.o.  female.    Chief Complaint: ER Follow-Up    ER Follow-Up: Patient evaluated by the ER on 11/21/22. Her BP was 156/68 in the ER, and she was tachycardic with . Patient has a history of anxiety and had taken an old prescription of Lexapro. CT head negative. EKG positive for sinus tachycardia.    Family History of Autoimmune Diseases: Patient states both her mother and sister are JHONATAN-positive.    Review of Systems   Eyes:  Negative for visual disturbance.   Respiratory:  Negative for shortness of breath.    Cardiovascular:  Negative for chest pain, palpitations and leg swelling.   Gastrointestinal:  Negative for abdominal pain, nausea and vomiting.   Neurological:  Negative for dizziness, syncope, weakness, light-headedness, numbness and headaches.   Psychiatric/Behavioral:  Negative for dysphoric mood, hallucinations, self-injury, sleep disturbance and suicidal ideas. The patient is nervous/anxious.      Objective:   /82 (BP Location: Right arm, Patient Position: Sitting, BP Method: Large (Automatic))   Pulse 90   Temp 98.6 °F (37 °C) (Oral)   Resp 18   Ht 5' 6" (1.676 m)   Wt 64.5 kg (142 lb 3.2 oz)   LMP 11/03/2022   SpO2 98%   BMI 22.95 kg/m²     Physical Exam  Vitals and nursing note reviewed.   Constitutional:       General: She is not in acute distress.     Appearance: Normal appearance. She is not ill-appearing or toxic-appearing.   HENT:      Head: Normocephalic and atraumatic.   Cardiovascular:      Rate and Rhythm: Normal rate and regular rhythm.      Heart sounds: Normal heart sounds. No murmur heard.  Pulmonary:      Effort: Pulmonary effort is normal. No respiratory distress.      Breath sounds: Normal breath sounds.   Abdominal:      General: There is no distension.      Palpations: Abdomen is soft.      Tenderness: There is no abdominal tenderness.   Musculoskeletal:         General: No deformity.      Right lower leg: " No edema.      Left lower leg: No edema.   Skin:     General: Skin is warm and dry.      Findings: No lesion or rash.   Neurological:      General: No focal deficit present.      Mental Status: She is alert. Mental status is at baseline.   Psychiatric:         Mood and Affect: Mood normal.         Behavior: Behavior normal.         Thought Content: Thought content normal.         Judgment: Judgment normal.     Assessment/Plan:   1. Anxiety  Assessment & Plan:  - Continue Lexapro 5mg daily.    Orders:  -     EScitalopram oxalate (LEXAPRO) 5 MG Tab; Take 1 tablet (5 mg total) by mouth once daily.  Dispense: 90 tablet; Refill: 3    2. Family history of autoimmune disorder  -     JHONATNA IgG by IFA; Future; Expected date: 11/30/2022    3. Hyperglycemia  -     Hemoglobin A1C; Future; Expected date: 11/30/2022    4. Annual physical exam  -     Lipid Panel; Future; Expected date: 11/30/2022     Follow up in about 1 month (around 12/30/2022) for Wellness.                   1 person assist

## 2023-09-11 NOTE — PROGRESS NOTE ADULT - SUBJECTIVE AND OBJECTIVE BOX
Patient is a 88y old  Female who presents with a chief complaint of fall (11 Sep 2023 16:10)  Patient admitted and found to have left ankle fracture.    Patient evaluated by ortho.   [place] ordered and placed on patient on Saturday am.     She may be WBAT with cam boot and follow up with Dr Ceballos in 2 weeks for repeat x rays.       This am , patient is without complaints.    She denies pain in the left ankle but c/o cam boot being heavy .   She  has been OOB to chair .       ALLERGIES:  chocolate (Hives)  sulfa drugs (Unknown)    MEDICATIONS  (STANDING):  atorvastatin 10 milliGRAM(s) Oral at bedtime  enoxaparin Injectable 40 milliGRAM(s) SubCutaneous every 24 hours  hydrocortisone 2.5% Lotion 1 Application(s) Topical two times a day  nebivolol 5 milliGRAM(s) Oral daily    MEDICATIONS  (PRN):  acetaminophen     Tablet .. 650 milliGRAM(s) Oral every 6 hours PRN Temp greater or equal to 38C (100.4F), Mild Pain (1 - 3)  aluminum hydroxide/magnesium hydroxide/simethicone Suspension 30 milliLiter(s) Oral every 4 hours PRN Dyspepsia  melatonin 3 milliGRAM(s) Oral at bedtime PRN Insomnia  ondansetron Injectable 4 milliGRAM(s) IV Push every 8 hours PRN Nausea and/or Vomiting  oxyCODONE    IR 5 milliGRAM(s) Oral every 6 hours PRN Severe Pain (7 - 10)    Vital Signs Last 24 Hrs  T(F): 98.1 (11 Sep 2023 12:12), Max: 98.2 (11 Sep 2023 05:25)  HR: 79 (11 Sep 2023 12:12) (67 - 79)  BP: 143/81 (11 Sep 2023 12:12) (143/81 - 176/76)  RR: 18 (11 Sep 2023 12:12) (18 - 18)  SpO2: 95% (11 Sep 2023 12:12) (95% - 98%)  I&O's Summary    10 Sep 2023 07:01  -  11 Sep 2023 07:00  --------------------------------------------------------  IN: 200 mL / OUT: 900 mL / NET: -700 mL    11 Sep 2023 07:01  -  11 Sep 2023 16:33  --------------------------------------------------------  IN: 200 mL / OUT: 400 mL / NET: -200 mL      PHYSICAL EXAM:  General: NAD, A/O x 3  ENT: MMM  Neck: Supple, No JVD  Lungs: Clear to auscultation bilaterally  Cardio: RRR, S1/S2, No murmurs  Abdomen: Soft, Nontender, Nondistended; Bowel sounds present  Extremities:  Left ankle in camboot , swelling improving +pulses , skin intact, ecchymosis on lateral side resolving.         LABS:    09-11    139  |  107  |  40  ----------------------------<  94  5.0   |  24  |  1.20    Ca    10.4      11 Sep 2023 07:24  Mg     1.8     09-11        Urinalysis Basic - ( 11 Sep 2023 07:24 )    Color: x / Appearance: x / SG: x / pH: x  Gluc: 94 mg/dL / Ketone: x  / Bili: x / Urobili: x   Blood: x / Protein: x / Nitrite: x   Leuk Esterase: x / RBC: x / WBC x   Sq Epi: x / Non Sq Epi: x / Bacteria: x        Culture - Urine (collected 06 Sep 2023 22:00)  Source: Clean Catch Clean Catch (Midstream)  Final Report (08 Sep 2023 23:57):    <10,000 CFU/mL Normal Urogenital Jeanie      COVID-19 PCR: Detected (09-09-23 @ 14:00)  COVID-19 PCR: Detected (09-06-23 @ 16:20)      RADIOLOGY & ADDITIONAL TESTS:    Care Discussed with Consultants/Other Providers:

## 2023-09-11 NOTE — DIETITIAN INITIAL EVALUATION ADULT - ORAL INTAKE PTA/DIET HISTORY
PTA patient w/ good appetite/intake. Per daughter monitors Na intake for her meals. Confirms chocolate allergy.

## 2023-09-11 NOTE — DIETITIAN INITIAL EVALUATION ADULT - NS FNS ENTERAL CURRENT ORDER
Medication:   Requested Prescriptions     Pending Prescriptions Disp Refills    phentermine (ADIPEX-P) 37.5 MG tablet 30 tablet 0     Sig: Take 1 tablet by mouth every morning (before breakfast) for 30 days. Max Daily Amount: 37.5 mg        Last Filled:    7/12/23    Patient Phone Number: [phone] (home)     Last appt: 7/25/2023   Next appt: 8/22/2023    Last OARRS: No flowsheet data found. Current diet order meets estimated nutrient requirements

## 2023-09-11 NOTE — OCCUPATIONAL THERAPY INITIAL EVALUATION ADULT - DIAGNOSIS, OT EVAL
Closed Fx of left Distal Fibula, Decreased independence with ADLs, COVID + 8/28, 9/6 Closed Fx of left Distal Fibula, Decreased independence with ADLs, COVID + 8/28 and 9/6.

## 2023-09-11 NOTE — PROGRESS NOTE ADULT - ASSESSMENT
89 yo F from home with PMH of CKD, dementia, recent COVID + 8/28 s/p paxlovid, presenting with s/p fall with left ankle pain found to have acute left distal fibula/lateral malleolus fracture. She was seen by orthopedic who recommended conservative management.     Left acute distal fracture of fibula/lateral malleolus, s/p fall  Gait imbalance  -Ortho consult appreciated, recommended conservative management  -WBAT with CAM boot   -Elevate LLE, ice packs   -Pain medication PRN  -PT eval appreciated, recommended acute rehab  -OT eval appreciated, recommended acute rehab  -PM&R eval appreciated, given prior functional status and living alone with stairs, feels that patient would be more appropriate for subacute rehab     Recent COVID  -Diagnosed 8/28 by [facility], completed paxlovid  -HERE, still testing + for COVID; asymptomatic  -Repeat PCR 9/9 - positive  -Discussed with Infection Control, patient is >10 days since first positive on 8/28, will d/c isolation     Hypomag (Resolved)  -Monitor level    HTN  HLD  -Continue bystolic and atorvastatin   -Monitor BP - improved    Hx of Dementia  -Will monitor, not on medications  -B12/folate/TSH/FT4 WNL  -Fall precautions    #Hx of CKD  -Cr 1.4 range in 1/2023, at baseline  -Avoid nephrotoxic agents   -Monitor creatinine    DVT PPx  -Lovenox SC    DISPO  -PT and OT recommended Acute PT  -PM&R recommended [facility]  -Dispo planning ongoing     Spoke with Daughter Edelmira over the phone and updated her on plan of care, family would prefer patient to go to acute rehab   89 yo F from home with PMH of CKD, dementia, recent COVID + 8/28 s/p paxlovid, presenting with s/p fall with left ankle pain found to have acute left distal fibula/lateral malleolus fracture. She was seen by orthopedic who recommended conservative management.     Left acute distal fracture of fibula/lateral malleolus, s/p fall  Gait imbalance  -Ortho consult appreciated, recommended conservative management  -WBAT with CAM boot   -Elevate LLE, ice packs   -Pain medication PRN  -PT eval appreciated, recommended acute rehab  -OT eval appreciated, recommended acute rehab  -PM&R eval appreciated, given prior functional status and living alone with stairs, feels that patient would be more appropriate for subacute rehab     Recent COVID  -Diagnosed 8/28 by [facility], completed paxlovid  -HERE, still testing + for COVID; asymptomatic  -Repeat PCR 9/9 - positive  -Discussed with Infection Control, patient is >10 days since first positive on 8/28, will d/c isolation     Hypomag (Resolved)  -Monitor level    HTN  HLD  -Continue bystolic and atorvastatin   -Monitor BP - improved    Hx of Dementia  -Will monitor, not on medications  -B12/folate/TSH/FT4 WNL  -Fall precautions    #Hx of CKD  -Cr 1.4 range in 1/2023, at baseline  -Avoid nephrotoxic agents   -Monitor creatinine    DVT PPx  -Lovenox SC    DISPO  -PT and OT recommended Acute Rehab  -PM&R recommended [facility]  -Dispo planning ongoing     Spoke with Daughter Edelmira over the phone and updated her on plan of care, family would prefer patient to go to acute rehab

## 2023-09-11 NOTE — DIETITIAN INITIAL EVALUATION ADULT - PERTINENT LABORATORY DATA
09-11    139  |  107  |  40<H>  ----------------------------<  94  5.0   |  24  |  1.20    Ca    10.4      11 Sep 2023 07:24  Mg     1.8     09-11

## 2023-09-11 NOTE — DIETITIAN INITIAL EVALUATION ADULT - OTHER INFO
Reason for Admission: fall  History of Present Illness:   87 yo F from home with PMH of CKD, dementia, recent COVID + 8/28 s/p paxlovid, presenting with left ankle pain.  Patient has been staying with her children since her COVID diagnosis.  COVID was tested at [facility].  Patient has completed five days of paxlovid.     According to the daughter, on Monday at 2AM, she heard someone calling her name; when her family went into patient's room, she was found sitting on floor; patient denies LOC, dizziness/lightheadness; she reports she slipped and fell.  Family did notice slight swelling of her left ankle and extreme pain when she tried to walk,  Today, her pain worsened and she could not walk and family brought her to the ER.    At this time patient tolerating current diet, w/ fair appetite/intake consuming 51-75% of meals. Daughter reports patient enjoying meals provided. No issues w/ dentition or chewing and swallowing current diet texture. Denies N/V/C/D, last BM 9/9 Per nursing flowsheets. Weight stable, .8lb 9/10. Patient w/ Hx of HDL, HTN recommend monitor need for DASH/TLC meal pattern.

## 2023-09-11 NOTE — DIETITIAN INITIAL EVALUATION ADULT - PERTINENT MEDS FT
MEDICATIONS  (STANDING):  atorvastatin 10 milliGRAM(s) Oral at bedtime  enoxaparin Injectable 40 milliGRAM(s) SubCutaneous every 24 hours  hydrocortisone 2.5% Lotion 1 Application(s) Topical two times a day  nebivolol 5 milliGRAM(s) Oral daily    MEDICATIONS  (PRN):  acetaminophen     Tablet .. 650 milliGRAM(s) Oral every 6 hours PRN Temp greater or equal to 38C (100.4F), Mild Pain (1 - 3)  aluminum hydroxide/magnesium hydroxide/simethicone Suspension 30 milliLiter(s) Oral every 4 hours PRN Dyspepsia  melatonin 3 milliGRAM(s) Oral at bedtime PRN Insomnia  ondansetron Injectable 4 milliGRAM(s) IV Push every 8 hours PRN Nausea and/or Vomiting  oxyCODONE    IR 5 milliGRAM(s) Oral every 6 hours PRN Severe Pain (7 - 10)

## 2023-09-11 NOTE — OCCUPATIONAL THERAPY INITIAL EVALUATION ADULT - NSACTIVITYREC_GEN_A_OT
Recommend 1 assist and RW with transfers.   Recommend cues to use hands to assist with transfers, initiating from surface of initial transfer location.

## 2023-09-11 NOTE — OCCUPATIONAL THERAPY INITIAL EVALUATION ADULT - PERTINENT HX OF CURRENT PROBLEM, REHAB EVAL
Pt. is an 89 yo F from home with PMH of CKD, dementia, recent COVID + 8/28 s/p paxlovid, presenting with s/p fall with left ankle pain found to have acute left distal fibula/lateral malleolus fracture. She was seen by orthopedic who recommended conservative management.     Left acute distal fracture of fibula/lateral malleolus, s/p fall  Gait imbalance  -Ortho consult appreciated, recommended conservative management  -WBAT with CAM boot   -Elevate LLE, ice packs   -Pain medication PRN

## 2023-09-11 NOTE — OCCUPATIONAL THERAPY INITIAL EVALUATION ADULT - ADDITIONAL COMMENTS
Pt is an 88 yr Right hand Dominant female, who lives alone in an apartment building.  2-3 PETER, Elevator to 20th floor apt, with tub/shower combo.  Pt. is unsure if there is a grabbar in shower/tub combo.  Pt. reports Walker/Cane at home, using them occasionally. Laundry is in basement. Pt. reports ordering in for groceries for delivery.

## 2023-09-11 NOTE — DIETITIAN INITIAL EVALUATION ADULT - ADD RECOMMEND
1. Continue regular diet for optimal PO intake, monitor need for DASH/TLC diet  2. Monitor PO intake, GI tolerance, skin integrity, labs, weight, and bowel movement regularity.   3. Provide ongoing diet education as needed  4. RD remains available upon request and will follow-up per protocol

## 2023-09-11 NOTE — PROGRESS NOTE ADULT - ASSESSMENT
88 year old female with hx of htn, hld, OA is stable with non displaced left ankle fx      Plan:  WBAT withcam boot left leg           DVT prophylaxis            tylenol for pain

## 2023-09-12 PROCEDURE — 99232 SBSQ HOSP IP/OBS MODERATE 35: CPT

## 2023-09-12 RX ADMIN — NEBIVOLOL HYDROCHLORIDE 5 MILLIGRAM(S): 5 TABLET ORAL at 05:16

## 2023-09-12 RX ADMIN — Medication 1 APPLICATION(S): at 17:29

## 2023-09-12 RX ADMIN — Medication 1 APPLICATION(S): at 05:16

## 2023-09-12 RX ADMIN — ATORVASTATIN CALCIUM 10 MILLIGRAM(S): 80 TABLET, FILM COATED ORAL at 21:47

## 2023-09-12 RX ADMIN — ENOXAPARIN SODIUM 40 MILLIGRAM(S): 100 INJECTION SUBCUTANEOUS at 05:20

## 2023-09-12 NOTE — PROGRESS NOTE ADULT - ASSESSMENT
89 yo F from home with PMH of CKD, dementia, recent COVID + 8/28 s/p paxlovid, presenting with s/p fall with left ankle pain found to have acute left distal fibula/lateral malleolus fracture. She was seen by orthopedic who recommended conservative management.     Left acute distal fracture of fibula/lateral malleolus, s/p fall  Gait imbalance  -Ortho consult appreciated, recommended conservative management  -WBAT with CAM boot   -Elevate LLE, ice packs   -Pain medication PRN  -PT eval appreciated, recommended acute rehab  -OT eval appreciated, recommended acute rehab  -PM&R eval appreciated, given prior functional status and living alone with stairs, feels that patient would be more appropriate for subacute rehab     Recent COVID  -Diagnosed 8/28 by [facility], completed paxlovid  -HERE, still testing + for COVID; asymptomatic  -Repeat PCR 9/9 - positive  -Discussed with Infection Control, patient is >10 days since first positive on 8/28, will d/c isolation     Hypomag (Resolved)  -Monitor level    HTN  HLD  -Continue bystolic and atorvastatin   -Monitor BP - improved    Hx of Dementia  -Will monitor, not on medications  -B12/folate/TSH/FT4 WNL  -Fall precautions    #Hx of CKD  -Cr 1.4 range in 1/2023, at baseline  -Avoid nephrotoxic agents   -Monitor creatinine    DVT PPx  -Lovenox SC    DISPO  -PT and OT recommended Acute Rehab  -PM&R recommended [facility]  - Dr. Sesay to discussed with family today  -Dispo planning ongoing     Spoke with Daughter Edelmira at bedside, all questions were answered.

## 2023-09-12 NOTE — PROGRESS NOTE ADULT - SUBJECTIVE AND OBJECTIVE BOX
Interval History  Patient seen and examined at bedside. No overnight events.  This morning patient is at her baseline mental status, she is pleasantly confused, she denies any pain.     ALLERGIES:  chocolate (Hives)  sulfa drugs (Unknown)    MEDICATIONS  (STANDING):  atorvastatin 10 milliGRAM(s) Oral at bedtime  enoxaparin Injectable 40 milliGRAM(s) SubCutaneous every 24 hours  hydrocortisone 2.5% Lotion 1 Application(s) Topical two times a day  nebivolol 5 milliGRAM(s) Oral daily    MEDICATIONS  (PRN):  acetaminophen     Tablet .. 650 milliGRAM(s) Oral every 6 hours PRN Temp greater or equal to 38C (100.4F), Mild Pain (1 - 3)  aluminum hydroxide/magnesium hydroxide/simethicone Suspension 30 milliLiter(s) Oral every 4 hours PRN Dyspepsia  melatonin 3 milliGRAM(s) Oral at bedtime PRN Insomnia  ondansetron Injectable 4 milliGRAM(s) IV Push every 8 hours PRN Nausea and/or Vomiting  oxyCODONE    IR 5 milliGRAM(s) Oral every 6 hours PRN Severe Pain (7 - 10)    Vital Signs Last 24 Hrs  T(F): 98.1 (12 Sep 2023 12:28), Max: 98.1 (12 Sep 2023 12:28)  HR: 71 (12 Sep 2023 12:28) (70 - 80)  BP: 143/73 (12 Sep 2023 12:28) (138/66 - 166/67)  RR: 17 (12 Sep 2023 12:28) (17 - 17)  SpO2: 96% (12 Sep 2023 12:28) (96% - 97%)  I&O's Summary    11 Sep 2023 07:01  -  12 Sep 2023 07:00  --------------------------------------------------------  IN: 200 mL / OUT: 800 mL / NET: -600 mL      BMI (kg/m2): 29.9 (09-11-23 @ 16:10)    PHYSICAL EXAM:  GENERAL: NAD  HEENT: NCAT  CHEST/LUNG: Clear to percussion bilaterally; No rales, rhonchi, wheezing  HEART: Regular rate and rhythm; No murmurs  ABDOMEN: Soft, Nontender, Nondistended; Bowel sounds present  MUSCULOSKELETAL/EXTREMITIES:  LLE CAM boot in place, +pulses, sensation intact  PSYCH: Appropriate affect  NEURO: Alert & Oriented x 1, confused at baseline    LABS:      09-11    139  |  107  |  40  ----------------------------<  94  5.0   |  24  |  1.20    Ca    10.4      11 Sep 2023 07:24  Mg     1.8     09-11    Urinalysis Basic - ( 11 Sep 2023 07:24 )    Color: x / Appearance: x / SG: x / pH: x  Gluc: 94 mg/dL / Ketone: x  / Bili: x / Urobili: x   Blood: x / Protein: x / Nitrite: x   Leuk Esterase: x / RBC: x / WBC x   Sq Epi: x / Non Sq Epi: x / Bacteria: x    Culture - Urine (collected 06 Sep 2023 22:00)  Source: Clean Catch Clean Catch (Midstream)  Final Report (08 Sep 2023 23:57):    <10,000 CFU/mL Normal Urogenital Jeanie    COVID-19 PCR: Detected (09-09-23 @ 14:00)  COVID-19 PCR: Detected (09-06-23 @ 16:20)

## 2023-09-13 PROCEDURE — 99232 SBSQ HOSP IP/OBS MODERATE 35: CPT

## 2023-09-13 RX ADMIN — NEBIVOLOL HYDROCHLORIDE 5 MILLIGRAM(S): 5 TABLET ORAL at 05:14

## 2023-09-13 RX ADMIN — ENOXAPARIN SODIUM 40 MILLIGRAM(S): 100 INJECTION SUBCUTANEOUS at 05:14

## 2023-09-13 RX ADMIN — ATORVASTATIN CALCIUM 10 MILLIGRAM(S): 80 TABLET, FILM COATED ORAL at 21:11

## 2023-09-13 RX ADMIN — Medication 1 APPLICATION(S): at 05:20

## 2023-09-13 NOTE — PROGRESS NOTE ADULT - SUBJECTIVE AND OBJECTIVE BOX
Interval History  Patient seen and examined. No overnight event.  This morning patient is at her baseline, she denies any acute complaints, no pain.  Dr. Sesay discussed with patient's son (Guillermo Randle, Orthopedic Surgeon in [place]) and family is amenable for [facility] placement.  Podiatry consult placed for nail trimming, patient with onychomycosis and her nails is digging into her toes causing pain with ambulation.     ALLERGIES:  chocolate (Hives)  sulfa drugs (Unknown)    MEDICATIONS  (STANDING):  atorvastatin 10 milliGRAM(s) Oral at bedtime  enoxaparin Injectable 40 milliGRAM(s) SubCutaneous every 24 hours  nebivolol 5 milliGRAM(s) Oral daily    MEDICATIONS  (PRN):  acetaminophen     Tablet .. 650 milliGRAM(s) Oral every 6 hours PRN Temp greater or equal to 38C (100.4F), Mild Pain (1 - 3)  aluminum hydroxide/magnesium hydroxide/simethicone Suspension 30 milliLiter(s) Oral every 4 hours PRN Dyspepsia  melatonin 3 milliGRAM(s) Oral at bedtime PRN Insomnia  ondansetron Injectable 4 milliGRAM(s) IV Push every 8 hours PRN Nausea and/or Vomiting  oxyCODONE    IR 5 milliGRAM(s) Oral every 6 hours PRN Severe Pain (7 - 10)    Vital Signs Last 24 Hrs  T(F): 97.9 (13 Sep 2023 13:41), Max: 98.6 (12 Sep 2023 17:30)  HR: 65 (13 Sep 2023 13:41) (65 - 85)  BP: 147/73 (13 Sep 2023 13:41) (134/72 - 147/73)  RR: 18 (13 Sep 2023 13:41) (16 - 18)  SpO2: 96% (13 Sep 2023 13:41) (96% - 97%)  I&O's Summary    12 Sep 2023 07:01  -  13 Sep 2023 07:00  --------------------------------------------------------  IN: 0 mL / OUT: 200 mL / NET: -200 mL    13 Sep 2023 07:01  -  13 Sep 2023 16:52  --------------------------------------------------------  IN: 0 mL / OUT: 700 mL / NET: -700 mL      BMI (kg/m2): 29.9 (09-12-23 @ 17:30)    PHYSICAL EXAM:  GENERAL: NAD  HEENT: NCAT  CHEST/LUNG: Clear to percussion bilaterally; No rales, rhonchi, wheezing  HEART: Regular rate and rhythm; No murmurs  ABDOMEN: Soft, Nontender, Nondistended; Bowel sounds present  MUSCULOSKELETAL/EXTREMITIES:  LLE CAM boot in place, +pulses, sensation intact, onychomycosis   PSYCH: Appropriate affect  NEURO: Alert & Oriented x 1, confused at baseline    LABS:      09-11    139  |  107  |  40  ----------------------------<  94  5.0   |  24  |  1.20    Ca    10.4      11 Sep 2023 07:24  Mg     1.8     09-11    Urinalysis Basic - ( 11 Sep 2023 07:24 )    Color: x / Appearance: x / SG: x / pH: x  Gluc: 94 mg/dL / Ketone: x  / Bili: x / Urobili: x   Blood: x / Protein: x / Nitrite: x   Leuk Esterase: x / RBC: x / WBC x   Sq Epi: x / Non Sq Epi: x / Bacteria: x    Culture - Urine (collected 06 Sep 2023 22:00)  Source: Clean Catch Clean Catch (Midstream)  Final Report (08 Sep 2023 23:57):    <10,000 CFU/mL Normal Urogenital Jeanie    COVID-19 PCR: Detected (09-09-23 @ 14:00)  COVID-19 PCR: Detected (09-06-23 @ 16:20)

## 2023-09-13 NOTE — PROGRESS NOTE ADULT - ASSESSMENT
87 yo F from home with PMH of CKD, dementia, recent COVID + 8/28 s/p paxlovid, presenting with s/p fall with left ankle pain found to have acute left distal fibula/lateral malleolus fracture. She was seen by orthopedic who recommended conservative management. She was evaluated by PT/OT and PM&R. She is pending dispo to [facility]    Left acute distal fracture of fibula/lateral malleolus, s/p fall  Gait imbalance  -Ortho consult appreciated, recommended conservative management  -WBAT with CAM boot   -Elevate LLE, ice packs   -Pain medication PRN  -PT eval appreciated, recommended acute rehab  -OT eval appreciated, recommended acute rehab  -PM&R eval appreciated, given prior functional status and living alone with stairs, feels that patient would be more appropriate for subacute rehab   -Dr. Sesay discussed with family today, they are now amenable to [facility] placement     Recent COVID  -Diagnosed 8/28 by [facility], completed paxlovid  -HERE, still testing + for COVID; asymptomatic  -Repeat PCR 9/9 - positive  -Discussed with Infection Control, patient is >10 days since first positive on 8/28, isolation was discontinued     Hypomag (Resolved)  -Monitor level    HTN  HLD  -Continue bystolic and atorvastatin   -Monitor BP - improved    Hx of Dementia  -Will monitor, not on medications  -B12/folate/TSH/FT4 WNL  -Fall precautions    #Hx of CKD  -Cr 1.4 range in 1/2023, at baseline  -Avoid nephrotoxic agents   -Monitor creatinine    Onychomycosis  -Her nail are digging into her toes and causing pain with weight bearing   -Podiatry consult placed, attempted to call Dr. Márquez but no response - please attempt again tomorrow     DVT PPx  -Lovenox SC    DISPO  -PT and OT recommended Acute Rehab  -PM&R recommended [facility]  - Dr. Sesay discussed with family today, they are now amenable to [facility] placement  -Dispo planning ongoing - waiting for choices from family    Spoke with Daughter Edelmira at bedside, all questions were answered.

## 2023-09-14 PROCEDURE — 99232 SBSQ HOSP IP/OBS MODERATE 35: CPT

## 2023-09-14 RX ADMIN — ENOXAPARIN SODIUM 40 MILLIGRAM(S): 100 INJECTION SUBCUTANEOUS at 05:08

## 2023-09-14 RX ADMIN — NEBIVOLOL HYDROCHLORIDE 5 MILLIGRAM(S): 5 TABLET ORAL at 05:08

## 2023-09-14 RX ADMIN — ATORVASTATIN CALCIUM 10 MILLIGRAM(S): 80 TABLET, FILM COATED ORAL at 21:19

## 2023-09-14 NOTE — PROGRESS NOTE ADULT - SUBJECTIVE AND OBJECTIVE BOX
Patient is a 88y old  Female who presents with a chief complaint of fall (13 Sep 2023 16:49)      Patient seen and examined at bedside. No overnight events reported.     ALLERGIES:  chocolate (Hives)  sulfa drugs (Unknown)    MEDICATIONS  (STANDING):  atorvastatin 10 milliGRAM(s) Oral at bedtime  enoxaparin Injectable 40 milliGRAM(s) SubCutaneous every 24 hours  nebivolol 5 milliGRAM(s) Oral daily    MEDICATIONS  (PRN):  acetaminophen     Tablet .. 650 milliGRAM(s) Oral every 6 hours PRN Temp greater or equal to 38C (100.4F), Mild Pain (1 - 3)  aluminum hydroxide/magnesium hydroxide/simethicone Suspension 30 milliLiter(s) Oral every 4 hours PRN Dyspepsia  melatonin 3 milliGRAM(s) Oral at bedtime PRN Insomnia  ondansetron Injectable 4 milliGRAM(s) IV Push every 8 hours PRN Nausea and/or Vomiting  oxyCODONE    IR 5 milliGRAM(s) Oral every 6 hours PRN Severe Pain (7 - 10)    Vital Signs Last 24 Hrs  T(F): 97.8 (14 Sep 2023 05:02), Max: 97.9 (13 Sep 2023 13:41)  HR: 64 (14 Sep 2023 05:02) (64 - 81)  BP: 159/81 (14 Sep 2023 05:02) (123/68 - 159/81)  RR: 17 (14 Sep 2023 05:02) (17 - 18)  SpO2: 98% (14 Sep 2023 05:02) (96% - 98%)  I&O's Summary    13 Sep 2023 07:01  -  14 Sep 2023 07:00  --------------------------------------------------------  IN: 0 mL / OUT: 1500 mL / NET: -1500 mL      PHYSICAL EXAM:  General: NAD, A/O x 3  ENT: No gross hearing impairment, Moist mucous membranes, no thrush  Neck: Supple, No JVD  Lungs: Clear to auscultation bilaterally, good air entry, non-labored breathing  Cardio: RRR, S1/S2, No murmur  Abdomen: Soft, Nontender, Nondistended; Bowel sounds present  Extremities: No calf tenderness, No cyanosis, No pitting edema  Psych: Appropriate mood and affect    LABS:                                                COVID-19 PCR: Detected (09-09-23 @ 14:00)  COVID-19 PCR: Detected (09-06-23 @ 16:20)    RADIOLOGY & ADDITIONAL TESTS:    Care Discussed with Consultants/Other Providers:    Patient is a 88y old  Female who presents with a chief complaint of fall (13 Sep 2023 16:49)      Patient seen and examined at bedside. No overnight events reported. Pt states pain is being managed.    ALLERGIES:  chocolate (Hives)  sulfa drugs (Unknown)    MEDICATIONS  (STANDING):  atorvastatin 10 milliGRAM(s) Oral at bedtime  enoxaparin Injectable 40 milliGRAM(s) SubCutaneous every 24 hours  nebivolol 5 milliGRAM(s) Oral daily    MEDICATIONS  (PRN):  acetaminophen     Tablet .. 650 milliGRAM(s) Oral every 6 hours PRN Temp greater or equal to 38C (100.4F), Mild Pain (1 - 3)  aluminum hydroxide/magnesium hydroxide/simethicone Suspension 30 milliLiter(s) Oral every 4 hours PRN Dyspepsia  melatonin 3 milliGRAM(s) Oral at bedtime PRN Insomnia  ondansetron Injectable 4 milliGRAM(s) IV Push every 8 hours PRN Nausea and/or Vomiting  oxyCODONE    IR 5 milliGRAM(s) Oral every 6 hours PRN Severe Pain (7 - 10)    Vital Signs Last 24 Hrs  T(F): 97.8 (14 Sep 2023 05:02), Max: 97.9 (13 Sep 2023 13:41)  HR: 64 (14 Sep 2023 05:02) (64 - 81)  BP: 159/81 (14 Sep 2023 05:02) (123/68 - 159/81)  RR: 17 (14 Sep 2023 05:02) (17 - 18)  SpO2: 98% (14 Sep 2023 05:02) (96% - 98%)  I&O's Summary    13 Sep 2023 07:01  -  14 Sep 2023 07:00  --------------------------------------------------------  IN: 0 mL / OUT: 1500 mL / NET: -1500 mL      PHYSICAL EXAM:  General: NAD, A/O x 3  ENT: No gross hearing impairment, Moist mucous membranes, no thrush  Neck: Supple, No JVD  Lungs: Clear to auscultation bilaterally, good air entry, non-labored breathing  Cardio: RRR, S1/S2, No murmur  Abdomen: Soft, Nontender, Nondistended; Bowel sounds present  Extremities: left lower ext in CAM boot, toes with onychomycosis b/l feet  Psych: Appropriate mood and affect    LABS:                                                COVID-19 PCR: Detected (09-09-23 @ 14:00)  COVID-19 PCR: Detected (09-06-23 @ 16:20)    RADIOLOGY & ADDITIONAL TESTS:    Care Discussed with Consultants/Other Providers:    Patient is a 88y old  Female who presents with a chief complaint of fall (13 Sep 2023 16:49)    Patient seen and examined at bedside. No overnight events reported. Pt states pain is being managed.    ALLERGIES:  chocolate (Hives)  sulfa drugs (Unknown)    MEDICATIONS  (STANDING):  atorvastatin 10 milliGRAM(s) Oral at bedtime  enoxaparin Injectable 40 milliGRAM(s) SubCutaneous every 24 hours  nebivolol 5 milliGRAM(s) Oral daily    MEDICATIONS  (PRN):  acetaminophen     Tablet .. 650 milliGRAM(s) Oral every 6 hours PRN Temp greater or equal to 38C (100.4F), Mild Pain (1 - 3)  aluminum hydroxide/magnesium hydroxide/simethicone Suspension 30 milliLiter(s) Oral every 4 hours PRN Dyspepsia  melatonin 3 milliGRAM(s) Oral at bedtime PRN Insomnia  ondansetron Injectable 4 milliGRAM(s) IV Push every 8 hours PRN Nausea and/or Vomiting  oxyCODONE    IR 5 milliGRAM(s) Oral every 6 hours PRN Severe Pain (7 - 10)    Vital Signs Last 24 Hrs  T(F): 97.8 (14 Sep 2023 05:02), Max: 97.9 (13 Sep 2023 13:41)  HR: 64 (14 Sep 2023 05:02) (64 - 81)  BP: 159/81 (14 Sep 2023 05:02) (123/68 - 159/81)  RR: 17 (14 Sep 2023 05:02) (17 - 18)  SpO2: 98% (14 Sep 2023 05:02) (96% - 98%)  I&O's Summary    13 Sep 2023 07:01  -  14 Sep 2023 07:00  --------------------------------------------------------  IN: 0 mL / OUT: 1500 mL / NET: -1500 mL    PHYSICAL EXAM:  General: NAD, A/O x 3  ENT: No gross hearing impairment, Moist mucous membranes, no thrush  Neck: Supple, No JVD  Lungs: Clear to auscultation bilaterally, good air entry, non-labored breathing  Cardio: RRR, S1/S2, No murmur  Abdomen: Soft, Nontender, Nondistended; Bowel sounds present  Extremities: left lower ext in CAM boot, toes with onychomycosis b/l feet  Psych: Appropriate mood and affect    LABS:                                                COVID-19 PCR: Detected (09-09-23 @ 14:00)  COVID-19 PCR: Detected (09-06-23 @ 16:20)    RADIOLOGY & ADDITIONAL TESTS:    Care Discussed with Consultants/Other Providers:

## 2023-09-14 NOTE — PROGRESS NOTE ADULT - ASSESSMENT
89 yo F from home with PMH of CKD, dementia, recent COVID + 8/28 s/p paxlovid, presenting with s/p fall with left ankle pain found to have acute left distal fibula/lateral malleolus fracture. She was seen by orthopedic who recommended conservative management.     Left acute distal fracture of fibula/lateral malleolus, s/p fall  Gait imbalance  -Ortho consult appreciated, recommended conservative management  -WBAT with CAM boot   -Elevate LLE, ice packs   -Pain medication PRN  -PT eval appreciated, recommended acute rehab  -OT eval appreciated, recommended acute rehab  -PM&R eval appreciated, given prior functional status and living alone with stairs, feels that patient would be more appropriate for subacute rehab   -Dr. Sesay discussed with family today, they are now amenable to [facility] placement     Recent COVID  -Diagnosed 8/28 by [facility], completed paxlovid  -HERE, still testing + for COVID; asymptomatic  -Repeat PCR 9/9 - positive  -Discussed with Infection Control, patient is >10 days since first positive on 8/28, isolation was discontinued     Hypomag (Resolved)  -Monitor level    HTN  HLD  -Continue bystolic and atorvastatin   -Monitor BP - improved    Hx of Dementia  -Will monitor, not on medications  -B12/folate/TSH/FT4 WNL  -Fall precautions    #Hx of CKD  -Cr 1.4 range in 1/2023, at baseline  -Avoid nephrotoxic agents   -Monitor creatinine    Onychomycosis  -Podiatry consult placed, call made to Dr. Márquez but no response    DVT PPx  -Lovenox SC    DISPO  -PT and OT recommended Acute Rehab  -PM&R recommended [facility]  - Dr. Sesay discussed with family today, they are now amenable to [facility] placement  -Dispo planning ongoing - waiting for choices from family    Daughter Edelmira at bedside, all questions were answered.   89 yo F from home with PMH of CKD, dementia, recent COVID + 8/28 s/p paxlovid, presenting with s/p fall with left ankle pain found to have acute left distal fibula/lateral malleolus fracture. She was seen by orthopedic who recommended conservative management.     Left acute distal fracture of fibula/lateral malleolus, s/p fall  Gait imbalance  -Ortho following, recommended conservative management  -WBAT with CAM boot   -Elevate LLE, ice packs   -Pain mgmt prn  -PT eval appreciated, recommended acute rehab  -OT eval appreciated, recommended acute rehab  -PM&R eval appreciated, given prior functional status and living alone with stairs, feels that patient would be more appropriate for subacute rehab   -Dr. Sesay, Physiatry updated the family--now amenable to [facility] placement     Recent COVID  -Diagnosed 8/28 by [facility], completed paxlovid  -still testing + for COVID; asymptomatic  -Repeat PCR 9/9 - positive  -Discussed with Infection Control, patient is >10 days since first positive on 8/28, isolation was discontinued     Hypomag (Resolved)  -Monitor level    HTN  HLD  -Continue bystolic and atorvastatin   -Monitor BP - improved    Hx of Dementia  -Will monitor, not on medications  -B12/folate/TSH/FT4 WNL  -Fall precautions    #Hx of CKD  -Cr 1.4 range in 1/2023, at baseline  -Avoid nephrotoxic agents   -Monitor creatinine    Onychomycosis  -Podiatry consulted Dr. Ignacio Arvizu to see today    DVT PPx  -Lovenox SC    DISPO:  D/C planning for [facility], updated Daughter-in-law Edelmira on plan of care.

## 2023-09-15 DIAGNOSIS — R26.81 UNSTEADINESS ON FEET: ICD-10-CM

## 2023-09-15 DIAGNOSIS — F03.90 UNSPECIFIED DEMENTIA WITHOUT BEHAVIORAL DISTURBANCE: ICD-10-CM

## 2023-09-15 LAB
ANION GAP SERPL CALC-SCNC: 6 MMOL/L — SIGNIFICANT CHANGE UP (ref 5–17)
BUN SERPL-MCNC: 50 MG/DL — HIGH (ref 7–23)
CALCIUM SERPL-MCNC: 10 MG/DL — SIGNIFICANT CHANGE UP (ref 8.4–10.5)
CHLORIDE SERPL-SCNC: 105 MMOL/L — SIGNIFICANT CHANGE UP (ref 96–108)
CO2 SERPL-SCNC: 27 MMOL/L — SIGNIFICANT CHANGE UP (ref 22–31)
CREAT SERPL-MCNC: 1.59 MG/DL — HIGH (ref 0.5–1.3)
EGFR: 31 ML/MIN/1.73M2 — LOW
GLUCOSE SERPL-MCNC: 104 MG/DL — HIGH (ref 70–99)
HCT VFR BLD CALC: 35.1 % — SIGNIFICANT CHANGE UP (ref 34.5–45)
HGB BLD-MCNC: 11.4 G/DL — LOW (ref 11.5–15.5)
MCHC RBC-ENTMCNC: 29.4 PG — SIGNIFICANT CHANGE UP (ref 27–34)
MCHC RBC-ENTMCNC: 32.5 GM/DL — SIGNIFICANT CHANGE UP (ref 32–36)
MCV RBC AUTO: 90.5 FL — SIGNIFICANT CHANGE UP (ref 80–100)
NRBC # BLD: 0 /100 WBCS — SIGNIFICANT CHANGE UP (ref 0–0)
PLATELET # BLD AUTO: 268 K/UL — SIGNIFICANT CHANGE UP (ref 150–400)
POTASSIUM SERPL-MCNC: 5.1 MMOL/L — SIGNIFICANT CHANGE UP (ref 3.5–5.3)
POTASSIUM SERPL-SCNC: 5.1 MMOL/L — SIGNIFICANT CHANGE UP (ref 3.5–5.3)
RBC # BLD: 3.88 M/UL — SIGNIFICANT CHANGE UP (ref 3.8–5.2)
RBC # FLD: 13 % — SIGNIFICANT CHANGE UP (ref 10.3–14.5)
SODIUM SERPL-SCNC: 138 MMOL/L — SIGNIFICANT CHANGE UP (ref 135–145)
WBC # BLD: 8.07 K/UL — SIGNIFICANT CHANGE UP (ref 3.8–10.5)
WBC # FLD AUTO: 8.07 K/UL — SIGNIFICANT CHANGE UP (ref 3.8–10.5)

## 2023-09-15 PROCEDURE — 93970 EXTREMITY STUDY: CPT | Mod: 26

## 2023-09-15 PROCEDURE — 99232 SBSQ HOSP IP/OBS MODERATE 35: CPT

## 2023-09-15 PROCEDURE — 99222 1ST HOSP IP/OBS MODERATE 55: CPT

## 2023-09-15 PROCEDURE — 73610 X-RAY EXAM OF ANKLE: CPT | Mod: 26,LT

## 2023-09-15 RX ADMIN — NEBIVOLOL HYDROCHLORIDE 5 MILLIGRAM(S): 5 TABLET ORAL at 05:06

## 2023-09-15 RX ADMIN — ENOXAPARIN SODIUM 40 MILLIGRAM(S): 100 INJECTION SUBCUTANEOUS at 05:06

## 2023-09-15 RX ADMIN — ATORVASTATIN CALCIUM 10 MILLIGRAM(S): 80 TABLET, FILM COATED ORAL at 21:22

## 2023-09-15 NOTE — PROGRESS NOTE ADULT - ASSESSMENT
87 yo F from home with PMH of CKD, dementia, recent COVID + 8/28 s/p paxlovid, presenting with s/p fall with left ankle pain found to have acute left distal fibula/lateral malleolus fracture. She was seen by orthopedics who recommended conservative management.     Left acute distal fracture of fibula/lateral malleolus, s/p fall  Gait imbalance  -Ortho following, recommended conservative management  -WBAT with CAM boot   -Pain mgmt prn  -PM&R eval appreciated, given prior functional status and living alone with stairs, feels that patient would be more appropriate for subacute rehab   -Dr. Sesay, Physiatry updated the family--now amenable to [facility] placement     Recent COVID  -Diagnosed 8/28 by [facility], completed paxlovid  -still testing + for COVID; asymptomatic  -Repeat PCR 9/9 - positive  -Discussed with Infection Control, patient is >10 days since first positive on 8/28, isolation was discontinued     Hypomag (Resolved)  -Monitor level    HTN  HLD  -Continue bystolic and atorvastatin   -Monitor vitals    Hx of Dementia  -pt with possible NPH on CT imaging, per family she had a MRI 1/23 at [facility] and also showed "fluid in the brain", Neurology is consulted and to see pt today  -B12/folate/TSH/FT4 in normal range  -Fall precautions    #Hx of CKD  -Cr 1.4 range in 1/2023, at baseline  -Avoid nephrotoxic agents   -Monitor creatinine    Onychomycosis  -Podiatry evaluated pt and she had bedside nail debridement, f/u as an outpt for continued nail care    DVT ppx - lovenox    DISPO:  D/C planning for [facility], updated Daughter-in-law Edelmira on plan of care.   89 yo F from home with PMH of CKD, dementia, recent COVID + 8/28 s/p paxlovid, presenting with s/p fall with left ankle pain found to have acute left distal fibula/lateral malleolus fracture. She was seen by orthopedics who recommended conservative management.     Left acute distal fracture of fibula/lateral malleolus, s/p fall  Gait imbalance  -Ortho following, recommended conservative management  -WBAT with CAM boot   -Pain mgmt prn  -PM&R eval appreciated, given prior functional status and living alone with stairs, feels that patient would be more appropriate for subacute rehab   -Dr. Sesay, Physiatry updated the family--now amenable to [facility] placement     Recent COVID  -Diagnosed 8/28 by [facility], completed paxlovid  -still testing + for COVID; asymptomatic  -Repeat PCR 9/9 - positive  -Discussed with Infection Control, patient is >10 days since first positive on 8/28, isolation was discontinued     Hypomag (Resolved)  -Monitor level    HTN  HLD  -Continue bystolic and atorvastatin   -Monitor vitals    Hx of Dementia  -pt with possible NPH on CT imaging, per family she had a MRI 1/23 at [facility] and also showed "fluid in the brain", Neurology is consulted and to see pt today  -B12/folate/TSH/FT4 in normal range  -Fall precautions    #Hx of CKD  -Cr 1.4 range in 1/2023, at baseline  -Avoid nephrotoxic agents   -Monitor creatinine    Onychomycosis  -Podiatry evaluated pt and she had bedside nail debridement, f/u as an outpt for continued nail care    DVT ppx - lovenox    DISPO:  D/C planning for [facility], updated Daughter-in-law Edelmira at bedside on plan of care.

## 2023-09-15 NOTE — PROGRESS NOTE ADULT - SUBJECTIVE AND OBJECTIVE BOX
Patient is a 88y old  Female who presents with a chief complaint of fall (14 Sep 2023 21:17)      Patient seen and examined at bedside. No overnight events reported.  Pt has no pain.    ALLERGIES:  chocolate (Hives)  sulfa drugs (Unknown)    MEDICATIONS  (STANDING):  atorvastatin 10 milliGRAM(s) Oral at bedtime  enoxaparin Injectable 40 milliGRAM(s) SubCutaneous every 24 hours  nebivolol 5 milliGRAM(s) Oral daily    MEDICATIONS  (PRN):  acetaminophen     Tablet .. 650 milliGRAM(s) Oral every 6 hours PRN Temp greater or equal to 38C (100.4F), Mild Pain (1 - 3)  aluminum hydroxide/magnesium hydroxide/simethicone Suspension 30 milliLiter(s) Oral every 4 hours PRN Dyspepsia  melatonin 3 milliGRAM(s) Oral at bedtime PRN Insomnia  ondansetron Injectable 4 milliGRAM(s) IV Push every 8 hours PRN Nausea and/or Vomiting    Vital Signs Last 24 Hrs  T(F): 97.8 (15 Sep 2023 05:04), Max: 98.1 (14 Sep 2023 21:35)  HR: 68 (15 Sep 2023 05:04) (54 - 76)  BP: 171/62 (15 Sep 2023 05:04) (134/76 - 171/62)  RR: 17 (15 Sep 2023 05:04) (16 - 18)  SpO2: 99% (15 Sep 2023 05:04) (97% - 99%)  I&O's Summary    14 Sep 2023 07:01  -  15 Sep 2023 07:00  --------------------------------------------------------  IN: 0 mL / OUT: 400 mL / NET: -400 mL      PHYSICAL EXAM:  General: NAD, A/O x 2  ENT: No gross hearing impairment, Moist mucous membranes, no thrush  Neck: Supple, No JVD  Lungs: Clear to auscultation bilaterally, good air entry, non-labored breathing  Cardio: RRR, S1/S2, No murmur  Abdomen: Soft, Nontender, Nondistended; Bowel sounds present  Extremities: left lower ext in boot, No calf tenderness, No cyanosis, No pitting edema  Psych: Appropriate mood and affect    LABS:                        11.4   8.07  )-----------( 268      ( 15 Sep 2023 05:45 )             35.1     09-15    138  |  105  |  50  ----------------------------<  104  5.1   |  27  |  1.59    Ca    10.0      15 Sep 2023 05:45                                        Urinalysis Basic - ( 15 Sep 2023 05:45 )    Color: x / Appearance: x / SG: x / pH: x  Gluc: 104 mg/dL / Ketone: x  / Bili: x / Urobili: x   Blood: x / Protein: x / Nitrite: x   Leuk Esterase: x / RBC: x / WBC x   Sq Epi: x / Non Sq Epi: x / Bacteria: x        COVID-19 PCR: Detected (09-09-23 @ 14:00)  COVID-19 PCR: Detected (09-06-23 @ 16:20)    RADIOLOGY & ADDITIONAL TESTS:    Care Discussed with Consultants/Other Providers:

## 2023-09-15 NOTE — CONSULT NOTE ADULT - CONSULT REQUESTED DATE/TIME
"Pt referred r/t sacral decubitus ulcer.  74 yom admitted with concern of sepsis.  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      Ht-71\", Wt-171#.  IBWR is 155-189#.  Review of weight hx notes weight of 205# (12/2017) but weight of 175# (10/2016) - question error vs fluid shift.     Labs/meds reviewed.      Pt is NPO at this time with plans for debridement.      Estimated nutritional needs are:  2340 calories (30 kcal/kg), 117 gm protein (1.5 gm/kg).      Suggest multivitamin with minerals daily, 220 mg zinc daily, 500 mg vitamin C bid.      Regular diet when able with Jose wound healing supplement bid.  If swallowing problems noted r/t old CVA pt will need speech rx evaluation.      RD will follow.    "
07-Sep-2023 13:00
11-Sep-2023 11:01
15-Sep-2023 11:00
14-Sep-2023 21:19

## 2023-09-15 NOTE — CONSULT NOTE ADULT - ASSESSMENT
In summary, Ms. Randle is a 88 years old woman with memory and balance problem presented with fall and found to have fracture left ankle.   She has had memory and balance problem for at least 2 years with slow progression of disease.   CT head   IMPRESSION: No acute intracranial hemorrhage, mass effect, or shift of the midline structures.  Imaging findings for which normal pressure hydrocephalus can be considered. Clinical correlation is required for this diagnosis.  Mild chronic white matter microvascular type changes.    It is possible that her memory and gait imbalance could be from NPH. DDx AD it is slow progressive, and her tone is normal, and no urinary problem.  Plan  MRI brain with CSF flow study.   If MRI show NPH, consider consult neurosx.   Her B12 is 488, TSH normal.  the rest per primary team    In summary, Ms. Randle is a 88 years old woman with memory and balance problem presented with fall and found to have fracture left ankle.   She has had memory and balance problem for at least 2 years with slow progression of disease.   CT head   IMPRESSION: No acute intracranial hemorrhage, mass effect, or shift of the midline structures.  Imaging findings for which normal pressure hydrocephalus can be considered. Clinical correlation is required for this diagnosis.  Mild chronic white matter microvascular type changes.    It is possible that her memory and gait imbalance could be from NPH. DDx AD it is slow progressive, and her tone is normal, and no urinary problem.  Plan  MRI brain with CSF flow study. If CSF flow can not be done, please do MRI brain wo,  If MRI show NPH, consider consult neurosx.   Her B12 is 488, TSH normal.  consider vascular study, for left leg (possible DVT)  the rest per primary team

## 2023-09-15 NOTE — CONSULT NOTE ADULT - SUBJECTIVE AND OBJECTIVE BOX
HPI:  89 yo F from home with PMH of CKD, dementia, recent COVID + 8/28 s/p paxlovid, presenting with left ankle pain.  Patient has been staying with her children since her COVID diagnosis.  COVID was tested at [facility].  Patient has completed five days of paxlovid.     According to the daughter, on Monday at 2AM, she heard someone calling her name; when her family went into patient's room, she was found sitting on floor; patient denies LOC, dizziness/lightheadness; she reports she slipped and fell.  Family did notice slight swelling of her left ankle and extreme pain when she tried to walk,  Today, her pain worsened and she could not walk and family brought her to the ER.      stable non displaced left ankle fracture     Treated non operatively  WBAT in cam boot   reports pain with movement       REVIEW OF SYSTEMS: No chest pain, shortness of breath, nausea, vomiting or diarhea.      PAST MEDICAL & SURGICAL HISTORY  Chronic kidney disease, unspecified CKD stage    HTN (hypertension)    HLD (hyperlipidemia)    No significant past surgical history        SOCIAL HISTORY  Smoking - Denied, EtOH - Denied, Drugs - Denied    FUNCTIONAL HISTORY:   Lives alone, equired some assist with ADL      CURRENT FUNCTIONAL STATUS:      FAMILY HISTORY   No pertinent family history in first degree relatives        RECENT LABS/IMAGING    09-11    139  |  107  |  40<H>  ----------------------------<  94  5.0   |  24  |  1.20    Ca    10.4      11 Sep 2023 07:24  Mg     1.8     09-11      Urinalysis Basic - ( 11 Sep 2023 07:24 )    Color: x / Appearance: x / SG: x / pH: x  Gluc: 94 mg/dL / Ketone: x  / Bili: x / Urobili: x   Blood: x / Protein: x / Nitrite: x   Leuk Esterase: x / RBC: x / WBC x   Sq Epi: x / Non Sq Epi: x / Bacteria: x        VITALS  T(C): 36.8 (09-11-23 @ 05:25), Max: 36.8 (09-10-23 @ 12:08)  HR: 77 (09-11-23 @ 05:25) (74 - 80)  BP: 171/79 (09-11-23 @ 05:25) (140/70 - 176/76)  RR: 18 (09-11-23 @ 05:25) (18 - 18)  SpO2: 97% (09-11-23 @ 05:25) (97% - 98%)  Wt(kg): --    ALLERGIES  chocolate (Hives)  sulfa drugs (Unknown)      MEDICATIONS   acetaminophen     Tablet .. 650 milliGRAM(s) Oral every 6 hours PRN  aluminum hydroxide/magnesium hydroxide/simethicone Suspension 30 milliLiter(s) Oral every 4 hours PRN  atorvastatin 10 milliGRAM(s) Oral at bedtime  enoxaparin Injectable 40 milliGRAM(s) SubCutaneous every 24 hours  hydrocortisone 2.5% Lotion 1 Application(s) Topical two times a day  melatonin 3 milliGRAM(s) Oral at bedtime PRN  nebivolol 5 milliGRAM(s) Oral daily  ondansetron Injectable 4 milliGRAM(s) IV Push every 8 hours PRN  oxyCODONE    IR 5 milliGRAM(s) Oral every 6 hours PRN      ----------------------------------------------------------------------------------------  PHYSICAL EXAM  Constitutional - NAD, Comfortable  HEENT - NCAT, EOMI  Neck - Supple, No limited ROM  Chest - CTA bilaterally, No wheeze, No rhonchi, No crackles  Cardiovascular - RRR, S1S2, No murmurs  Abdomen - BS+, Soft, NTND  Extremities - ecchymosis on lateral side, mild edema   Neurologic Exam -                    Cognitive - Awake, Alert, AAO to self, place, date, year, situation  4/5 grossly throughout                        Sensory - Intact to LT     Reflexes - DTR Intact, No primitive reflexive     Balance - WNL Static  Psychiatric - Mood stable, Affect WNL  
HPI:  87 yo F from home with PMH of CKD, dementia, recent COVID + 8/28 s/p paxlovid, presenting with left ankle pain.  Patient has been staying with her children since her COVID diagnosis.  COVID was tested at [facility].  Patient has completed five days of paxlovid.     According to the daughter, on Monday at 2AM, she heard someone calling her name; when her family went into patient's room, she was found sitting on floor; patient denies LOC, dizziness/lightheadness; she reports she slipped and fell.  Family did notice slight swelling of her left ankle and extreme pain when she tried to walk,  Today, her pain worsened and she could not walk and family brought her to the ER.   (06 Sep 2023 16:30)    Patient was admitted and w/u, he was found to have Left ankle fracture.    Orho was called to consult .  Patient is lying in bed daughter by her side.    She appears comfortable and denies pain.,   She is presently in a short sugar tong splint.    On xray, impression showed Moderate swelling centered at the left ankle and a displaced oblique fracture of the distal fibula/lateral malleolus.      Vital Signs Last 24 Hrs  T(C): 37.1 (07 Sep 2023 11:34), Max: 37.1 (07 Sep 2023 11:34)  T(F): 98.7 (07 Sep 2023 11:34), Max: 98.7 (07 Sep 2023 11:34)  HR: 75 (07 Sep 2023 11:34) (69 - 90)  BP: 164/72 (07 Sep 2023 11:34) (142/78 - 190/74  RR: 18 (07 Sep 2023 11:34) (17 - 18)  SpO2: 96% (07 Sep 2023 11:34) (95% - 98%)    Parameters below as of 07 Sep 2023 11:34  Patient On (Oxygen Delivery Method): room air      PAST MEDICAL & SURGICAL HISTORY:  Chronic kidney disease, unspecified CKD stage  HTN (hypertension)  HLD (hyperlipidemia)  No significant past surgical history      < from: Xray Ankle Complete 3 Views, Left (09.06.23 @ 15:08) >  ACC: [id number] EXAM:  XR CHEST PORTABLE IMMED 1V   ORDERED BY: PEGGY ROJAS     PROCEDURE DATE:  09/06/2023          INTERPRETATION:  INDICATION: Admission film    Portable chest 2:59 PM    COMPARISON: None    Overlying metallic artifacts from the patient's bra.    FINDINGS:  Heart/Vascular: The heart size, mediastinum, hilum and aorta are within   normal limits for projection. Calcified aortic knob.  Pulmonary: Midline trachea. There is no focal infiltrate, congestion or   effusion.  Bones: There is no fracture.  Lines and catheter: None    AP, lateral and oblique view of the right ankle.  AP and lateral view right foot    FINDINGS: Moderate swelling centered at the ankle. Displaced oblique   fracture of the distal fibula/lateral malleolus. No other acute fracture   or dislocation. Ankle mortise intact.  Possible healed fracture base of fifth metatarsal. Atherosclerotic   change. Small plantar spur. Moderate degenerative changes midfoot.   Moderate hallux valgus. Moderate hammertoe changes. Superficial calcified   phleboliths. The visualized portion of the tibial hardware is intact.    IMPRESSION:    No acute pulmonary disease.    Moderate swelling centered at the ankle.  Displaced oblique fracture of the distal fibula/lateral malleolus    --- End of Report ---      < end of copied text >    Medications (Standing)  Atorvastatin 10 mg po at bedtime   enoxaparin Injectable 40 milliGRAM(s) SubCutaneous every 24 hours  nebivolol 5 milliGRAM(s) Oral daily    MEDICATIONS  (PRN):  acetaminophen     Tablet .. 650 milliGRAM(s) Oral every 6 hours PRN Temp greater or equal to 38C (100.4F), Mild Pain (1 - 3)  aluminum hydroxide/magnesium hydroxide/simethicone Suspension 30 milliLiter(s) Oral every 4 hours PRN Dyspepsia  melatonin 3 milliGRAM(s) Oral at bedtime PRN Insomnia  ondansetron Injectable 4 milliGRAM(s) IV Push every 8 hours PRN Nausea and/or Vomiting  oxyCODONE    IR 5 milliGRAM(s) Oral every 6 hours PRN Severe Pain (7 - 10)      PE:  Alert and oriented X 2  daughter by her side   Lungs:   CTA   Cor:  RR S1 S2  Abd:  soft, non tender , +BS ,   Ext:   Left ankle in  short sugar tong splint, toes warm , good CR , poor nail hygiene, , + sensation   SCD  right leg while in bed                           11.9   7.91  )-----------( 230      ( 06 Sep 2023 16:20 )             36.4   09-07    142  |  108  |  24<H>  ----------------------------<  105<H>  4.1   |  26  |  1.28    Ca    9.8      07 Sep 2023 06:50  Mg     1.3     09-07    TPro  6.8  /  Alb  3.2<L>  /  TBili  0.7  /  DBili  x   /  AST  18  /  ALT  16  /  AlkPhos  94  09-06        
S :   88y year old Female seen bedside for Right and Left foot painful thick, dystrophic, and long toenails digits 1-5, and preventative foot examination.       HPI:  89 yo F from home with PMH of CKD, dementia, recent COVID + 8/28 s/p paxlovid, presenting with left ankle pain.  Patient has been staying with her children since her COVID diagnosis.  COVID was tested at [facility].  Patient has completed five days of paxlovid.     According to the daughter, patient slipped and fell.  Family noticed slight swelling of her left ankle and extreme pain on day of admission.    PMH: Chronic kidney disease, unspecified CKD stage    HTN (hypertension)    HLD (hyperlipidemia)      PSH:No significant past surgical history        Allergies:chocolate (Hives)  sulfa drugs (Unknown)      Labs:      WBC Trend  8.83 Date (09-08 @ 07:30)  7.91 Date (09-06 @ 16:20)      Chem              T(F): 97.8 (09-14-23 @ 12:48), Max: 97.9 (09-14-23 @ 08:39)  HR: 54 (09-14-23 @ 12:48) (54 - 64)  BP: 138/65 (09-14-23 @ 12:48) (138/65 - 168/65)  RR: 16 (09-14-23 @ 12:48) (16 - 17)  SpO2: 97% (09-14-23 @ 12:48) (94% - 98%)  Wt(kg): --    O:   Integument:  Skin warm, dry and supple bilateral.  No open lesions or inter-digital macerations noted bilateral.   Toenails 1-5 Right and Left feet thickened, elongated, discolored, and dystrophic with subungual debris.    Vascular: Dorsalis Pedis and Posterior Tibial pulses 1/4.  Capillary re-fill time less than 3 seconds digits 1-5 bilateral.   Neuro: Protective sensation intact to the level of the digits bilateral.  MSK: Muscle strength 5/5 all major muscle groups bilateral. No structural abnormality, bilaterally      A:   1. Onychomycosis digits 1-5 Bilateral  2. Pain from Elongated nails    P:   Discussed diagnosis and treatment with patient  Aseptic debridement of nails 1-5 bilateral with sterile nail pack  Discussed importance of daily foot examinations and proper shoe gear  Please re-consult as needed.  
Neurology consult    TINO [id number]    HPI:  87 yo F from home with PMH of CKD, dementia, recent COVID + 8/28 s/p paxlovid, presenting with left ankle pain.  Patient has been staying with her children since her COVID diagnosis.  COVID was tested at [facility].  Patient has completed five days of paxlovid.     According to the daughter, on Monday at 2AM, she heard someone calling her name; when her family went into patient's room, she was found sitting on floor; patient denies LOC, dizziness/lightheadness; she reports she slipped and fell.  Family did notice slight swelling of her left ankle and extreme pain when she tried to walk,  Today, her pain worsened and she could not walk and family brought her to the ER.       (06 Sep 2023 16:30)    History from patient and daughter in law.  Patient has had short term memory problem for about 2 years. She remembers things in the past very well however, she forgets such as what she eats or who she meets recently. Her son manages her finance, and help with food preparation. She also has balance problem for sometimes. She went to see neurologist in [place], MRI brain was ordered. I do not have result to reviewed, but per daughter in law, doctor said there is some fluid more than usual in her brain but no need for surgery at that time and decide to follow with MRI 6 months later.   Patient denied numbness or tingling sensation, no neuropathy, no urinary problem. Denies weakness, abnormal movement.       MEDICATIONS    acetaminophen     Tablet .. 650 milliGRAM(s) Oral every 6 hours PRN  aluminum hydroxide/magnesium hydroxide/simethicone Suspension 30 milliLiter(s) Oral every 4 hours PRN  atorvastatin 10 milliGRAM(s) Oral at bedtime  enoxaparin Injectable 40 milliGRAM(s) SubCutaneous every 24 hours  melatonin 3 milliGRAM(s) Oral at bedtime PRN  nebivolol 5 milliGRAM(s) Oral daily  ondansetron Injectable 4 milliGRAM(s) IV Push every 8 hours PRN         Family history: No history of dementia, strokes, or seizures   FAMILY HISTORY:  No pertinent family history in first degree relatives      SOCIAL HISTORY -- No history of tobacco or alcohol use     Allergies    chocolate (Hives)  sulfa drugs (Unknown)    Intolerances            Vital Signs Last 24 Hrs  T(C): 36.6 (15 Sep 2023 05:04), Max: 36.7 (14 Sep 2023 21:35)  T(F): 97.8 (15 Sep 2023 05:04), Max: 98.1 (14 Sep 2023 21:35)  HR: 77 (15 Sep 2023 13:30) (68 - 77)  BP: 144/66 (15 Sep 2023 13:30) (134/76 - 171/62)  BP(mean): --  RR: 17 (15 Sep 2023 05:04) (17 - 18)  SpO2: 97% (15 Sep 2023 13:30) (97% - 99%)    Parameters below as of 15 Sep 2023 13:30  Patient On (Oxygen Delivery Method): room air          REVIEW OF SYSTEMS:    Constitutional: No fever, chills, fatigue, weakness  Eyes: no eye pain, visual disturbances, or discharge  ENT:  No difficulty hearing, tinnitus, vertigo; No sinus or throat pain  Neck: No pain or stiffness  Respiratory: No cough, dyspnea, wheezing   Cardiovascular: No chest pain, palpitations,   Gastrointestinal: No abdominal or epigastric pain. No nausea, vomiting  No diarrhea or constipation.   Genitourinary: No dysuria, frequency, hematuria or incontinence  Neurological: No headaches, lightheadedness, vertigo, numbness or tremors  Psychiatric: No depression, anxiety, mood swings or difficulty sleeping  Musculoskeletal: No joint pain or swelling; No muscle, back or extremity pain  Skin: No itching, burning, rashes or lesions   Lymph Nodes: No enlarged glands  Endocrine: No heat or cold intolerance; No hair loss, No h/o diabetes or thyroid dysfunction  Allergy and Immunologic: No hives or eczema    On Neurological Examination:    Mental Status - Patient is alert, awake. oriented to place and person.     Follows commands well and able to answer questions appropriately. Mood and affect  normal    Speech -   Fluent                            Cranial Nerves - Pupils 3 mm equal and reactive to light, mild ptosis on the right (it has been like this for sometime0, no fatigability.   extraocular eye movements intact. face symmetric. normal face sensation, and tongue midline.    Motor Exam - 5/5 all except left LE but able to lift against gravity.     Muscle tone - is normal all over. No asymmetry is seen.      Sensory : intact to light touch on the right.     Gait - deferred   her whole left leg is larger than the right (including thigh). No pain. Patient is unable to tell when she had legs size difference.   right calf with cast.           LABS:  CBC Full  -  ( 15 Sep 2023 05:45 )  WBC Count : 8.07 K/uL  RBC Count : 3.88 M/uL  Hemoglobin : 11.4 g/dL  Hematocrit : 35.1 %  Platelet Count - Automated : 268 K/uL  Mean Cell Volume : 90.5 fl  Mean Cell Hemoglobin : 29.4 pg  Mean Cell Hemoglobin Concentration : 32.5 gm/dL  Auto Neutrophil # : x  Auto Lymphocyte # : x  Auto Monocyte # : x  Auto Eosinophil # : x  Auto Basophil # : x  Auto Neutrophil % : x  Auto Lymphocyte % : x  Auto Monocyte % : x  Auto Eosinophil % : x  Auto Basophil % : x    Urinalysis Basic - ( 15 Sep 2023 05:45 )    Color: x / Appearance: x / SG: x / pH: x  Gluc: 104 mg/dL / Ketone: x  / Bili: x / Urobili: x   Blood: x / Protein: x / Nitrite: x   Leuk Esterase: x / RBC: x / WBC x   Sq Epi: x / Non Sq Epi: x / Bacteria: x      09-15    138  |  105  |  50<H>  ----------------------------<  104<H>  5.1   |  27  |  1.59<H>    Ca    10.0      15 Sep 2023 05:45

## 2023-09-16 LAB
ANION GAP SERPL CALC-SCNC: 7 MMOL/L — SIGNIFICANT CHANGE UP (ref 5–17)
BUN SERPL-MCNC: 48 MG/DL — HIGH (ref 7–23)
CALCIUM SERPL-MCNC: 10.1 MG/DL — SIGNIFICANT CHANGE UP (ref 8.4–10.5)
CHLORIDE SERPL-SCNC: 106 MMOL/L — SIGNIFICANT CHANGE UP (ref 96–108)
CO2 SERPL-SCNC: 26 MMOL/L — SIGNIFICANT CHANGE UP (ref 22–31)
CREAT SERPL-MCNC: 1.41 MG/DL — HIGH (ref 0.5–1.3)
EGFR: 36 ML/MIN/1.73M2 — LOW
GLUCOSE SERPL-MCNC: 95 MG/DL — SIGNIFICANT CHANGE UP (ref 70–99)
POTASSIUM SERPL-MCNC: 5.2 MMOL/L — SIGNIFICANT CHANGE UP (ref 3.5–5.3)
POTASSIUM SERPL-SCNC: 5.2 MMOL/L — SIGNIFICANT CHANGE UP (ref 3.5–5.3)
SODIUM SERPL-SCNC: 139 MMOL/L — SIGNIFICANT CHANGE UP (ref 135–145)

## 2023-09-16 PROCEDURE — 99232 SBSQ HOSP IP/OBS MODERATE 35: CPT

## 2023-09-16 RX ORDER — AMLODIPINE BESYLATE 2.5 MG/1
2.5 TABLET ORAL DAILY
Refills: 0 | Status: DISCONTINUED | OUTPATIENT
Start: 2023-09-16 | End: 2023-09-19

## 2023-09-16 RX ADMIN — NEBIVOLOL HYDROCHLORIDE 5 MILLIGRAM(S): 5 TABLET ORAL at 05:18

## 2023-09-16 RX ADMIN — ENOXAPARIN SODIUM 40 MILLIGRAM(S): 100 INJECTION SUBCUTANEOUS at 05:18

## 2023-09-16 RX ADMIN — ATORVASTATIN CALCIUM 10 MILLIGRAM(S): 80 TABLET, FILM COATED ORAL at 21:11

## 2023-09-16 NOTE — PROGRESS NOTE ADULT - SUBJECTIVE AND OBJECTIVE BOX
Patient is a 88y old  Female who presents with a chief complaint of fall (16 Sep 2023 07:21)    Patient seen and examined at bedside. No overnight events reported. Patient denies pain/discomfort.     ALLERGIES:  chocolate (Hives)  sulfa drugs (Unknown)    MEDICATIONS  (STANDING):  atorvastatin 10 milliGRAM(s) Oral at bedtime  enoxaparin Injectable 40 milliGRAM(s) SubCutaneous every 24 hours  nebivolol 5 milliGRAM(s) Oral daily    MEDICATIONS  (PRN):  acetaminophen     Tablet .. 650 milliGRAM(s) Oral every 6 hours PRN Temp greater or equal to 38C (100.4F), Mild Pain (1 - 3)  aluminum hydroxide/magnesium hydroxide/simethicone Suspension 30 milliLiter(s) Oral every 4 hours PRN Dyspepsia  melatonin 3 milliGRAM(s) Oral at bedtime PRN Insomnia  ondansetron Injectable 4 milliGRAM(s) IV Push every 8 hours PRN Nausea and/or Vomiting    Vital Signs Last 24 Hrs  T(F): 97.8 (16 Sep 2023 05:00), Max: 98.2 (15 Sep 2023 22:02)  HR: 69 (16 Sep 2023 05:00) (69 - 77)  BP: 163/70 (16 Sep 2023 05:00) (144/66 - 163/70)  RR: 18 (16 Sep 2023 05:00) (18 - 18)  SpO2: 97% (16 Sep 2023 05:00) (97% - 98%)    I&O's Summary  15 Sep 2023 07:01  -  16 Sep 2023 07:00  --------------------------------------------------------  IN: 0 mL / OUT: 1375 mL / NET: -1375 mL    PHYSICAL EXAM:  General: NAD, A/O x 2 (unsure of date)  ENT: No gross hearing impairment, Moist mucous membranes, no thrush  Neck: Supple, No JVD  Lungs: Clear to auscultation bilaterally, good air entry, non-labored breathing  Cardio: RRR, S1/S2, No murmur  Abdomen: Soft, Nontender, Nondistended; Bowel sounds present  Extremities: No calf tenderness, No cyanosis, RLE trace edema, LLE in CAM boot, sensation/motor intact   Psych: Appropriate mood and affect    LABS:                        11.4   8.07  )-----------( 268      ( 15 Sep 2023 05:45 )             35.1     09-15    138  |  105  |  50  ----------------------------<  104  5.1   |  27  |  1.59    Ca    10.0      15 Sep 2023 05:45      Urinalysis Basic - ( 15 Sep 2023 05:45 )    Color: x / Appearance: x / SG: x / pH: x  Gluc: 104 mg/dL / Ketone: x  / Bili: x / Urobili: x   Blood: x / Protein: x / Nitrite: x   Leuk Esterase: x / RBC: x / WBC x   Sq Epi: x / Non Sq Epi: x / Bacteria: x    COVID-19 PCR: Detected (09-09-23 @ 14:00)  COVID-19 PCR: Detected (09-06-23 @ 16:20)    RADIOLOGY & ADDITIONAL TESTS:    Care Discussed with Consultants/Other Providers:

## 2023-09-16 NOTE — PROGRESS NOTE ADULT - ASSESSMENT
87 yo F from home with PMH of CKD, dementia, recent COVID + 8/28 s/p paxlovid, presenting with s/p fall with left ankle pain found to have acute left distal fibula/lateral malleolus fracture. She was seen by orthopedics who recommended conservative management.     #Left acute distal fracture of fibula/lateral malleolus, s/p fall  #Gait imbalance  - Ortho following, recommended conservative management  - WBAT with CAM boot   - Pain mgmt prn  - PM&R eval appreciated, given prior functional status and living alone with stairs, feels that patient would be more appropriate for subacute rehab   - Dr. Sesay, physiatry updated the family - now amenable to ROBIN placement     #Gait imbalance, short term memory problems, r/p NPH vs AD  - Possible NPH on CT imaging  - B12/folate/TSH/FT4 in normal range  - f/u MRI brain   - If MRI show NPH, consider consult neurosx.   - Neuro consulted, following     #Recent COVID  - Diagnosed 8/28 by [facility], completed paxlovid  - Ctill testing + for COVID; asymptomatic  - Repeat PCR 9/9 - positive  - Discussed with Infection Control, patient is >10 days since first positive on 8/28, isolation was discontinued     #Hypomag (Resolved)  - Monitor level    #HTN  #HLD  - Continue bystolic and atorvastatin   - Monitor vitals    #Hx of CKD  - Cr 1.4 range in 1/2023, at baseline  - Avoid nephrotoxic agents   - Monitor creatinine    #Onychomycosis  - Podiatry evaluated pt and she had bedside nail debridement, f/u as an outpt for continued nail care    #DVT ppx   - Lovenox    GOC: Full Code     DISPO: Pending MRI, then D/C planning for ROBIN    Daughter-in-law Edelmira     87 yo F from home with PMH of CKD, dementia, recent COVID + 8/28 s/p paxlovid, presenting with s/p fall with left ankle pain found to have acute left distal fibula/lateral malleolus fracture. She was seen by orthopedics who recommended conservative management.     #Left acute distal fracture of fibula/lateral malleolus, s/p fall  #Gait imbalance  - Ortho following, recommended conservative management for fx  - WBAT with CAM boot   - Pain mgmt PRN  - Bilateral LE ultrasound negative for DVT  - PM&R eval appreciated, given prior functional status and living alone with stairs, feels that patient would be more appropriate for subacute rehab   - Dr. Sesay, physiatry updated the family - now amenable to ROBIN placement     #Gait imbalance, short term memory problems, r/p NPH vs AD  - Possible NPH on CT imaging  - B12/folate/TSH/FT4 in normal range  - f/u MRI brain   - If MRI show NPH, consider consult neurosx.   - Neuro consulted, following     #Recent COVID  - Diagnosed 8/28 by [facility], completed paxlovid  - Still testing + for COVID; asymptomatic  - Repeat PCR 9/9 - positive  - Discussed with Infection Control, patient is >10 days since first positive on 8/28, isolation was discontinued     #Hypomag (Resolved)  - Monitor level    #HTN  #HLD  - Continue bystolic and atorvastatin   - Monitor vitals    #Hx of CKD  - Cr 1.4 range in 1/2023, at baseline  - Avoid nephrotoxic agents   - Monitor creatinine    #Onychomycosis  - Podiatry evaluated pt and she had bedside nail debridement, f/u as an outpt for continued nail care    #DVT ppx   - Lovenox    GOC: Full Code     DISPO: Pending MRI, then D/C planning for [facility]IN    Daughter-in-law Edelmira     89 yo F from home with PMH of CKD, dementia, recent COVID + 8/28 s/p paxlovid, presenting with s/p fall with left ankle pain found to have acute left distal fibula/lateral malleolus fracture. She was seen by orthopedics who recommended conservative management.     #Left acute distal fracture of fibula/lateral malleolus, s/p fall  #Gait imbalance  - Ortho following, recommended conservative management for fx  - WBAT with CAM boot   - Pain mgmt PRN  - Bilateral LE ultrasound negative for DVT  - PM&R eval appreciated, given prior functional status and living alone with stairs, feels that patient would be more appropriate for subacute rehab   - Dr. Sesay, physiatry updated the family - now amenable to ROBIN placement     #Gait imbalance, short term memory problems, r/o NPH vs AD  - Possible NPH on CT imaging, it is possible that her memory and gait imbalance could be from NPH as per neurology   - Per family she had a MRI 1/23 at [facility] and also showed "fluid in the brain"  - B12/folate/TSH/FT4 in normal range  - f/u MRI brain   - If MRI show NPH, consider consult neurosx.   - Neuro consulted, following     #Recent COVID  - Diagnosed 8/28 by [facility], completed paxlovid  - Still testing + for COVID; asymptomatic  - Repeat PCR 9/9 - positive  - Discussed with Infection Control, patient is >10 days since first positive on 8/28, isolation was discontinued     #Hypomag (Resolved)  - Monitor level    #HTN  #HLD  - Continue bystolic and atorvastatin   - Monitor vitals    #Hx of CKD  - Cr 1.4 range in 1/2023, at baseline  - Avoid nephrotoxic agents   - Monitor creatinine    #Onychomycosis  - Podiatry evaluated pt and she had bedside nail debridement, f/u as an outpt for continued nail care    #DVT ppx   - Lovenox    GOC: Full Code     DISPO: Pending MRI, then D/C planning for ROBIN    Daughter-in-law Edelmira     89 yo F from home with PMH of CKD, dementia, recent COVID + 8/28 s/p paxlovid, presenting with s/p fall with left ankle pain found to have acute left distal fibula/lateral malleolus fracture. She was seen by orthopedics who recommended conservative management.     #Left acute distal fracture of fibula/lateral malleolus, s/p fall  #Gait imbalance  - Ortho following, recommended conservative management for fx  - WBAT with CAM boot   - Pain mgmt PRN  - Bilateral LE ultrasound negative for DVT  - PM&R eval appreciated, given prior functional status and living alone with stairs, feels that patient would be more appropriate for subacute rehab   - Dr. Sesay, physiatry updated the family - now amenable to ROBIN placement     #Gait imbalance, short term memory problems, r/o NPH vs AD  - Possible NPH on CT imaging, it is possible that her memory and gait imbalance could be from NPH as per neurology   - Per family she had a MRI 1/23 at [facility] and also showed "fluid in the brain"  - B12/folate/TSH/FT4 in normal range  - f/u MRI brain   - If MRI show NPH, consider consult neurosx.   - Neuro consulted, following     #Recent COVID  - Diagnosed 8/28 by [facility], completed paxlovid  - Still testing + for COVID; asymptomatic  - Repeat PCR 9/9 - positive  - Discussed with Infection Control, patient is >10 days since first positive on 8/28, isolation was discontinued     #Hypomag (Resolved)  - Monitor level    #HTN  #HLD  - Continue bystolic and atorvastatin   - Monitor vitals  - Will start amlodipine 2.5 mg daily as BP slightly elevated     #Hx of CKD  - Cr 1.4 range in 1/2023, at baseline  - Avoid nephrotoxic agents   - Monitor creatinine    #Onychomycosis  - Podiatry evaluated pt and she had bedside nail debridement, f/u as an outpt for continued nail care    #DVT ppx   - Lovenox    GOC: Full Code     DISPO: Pending MRI, then D/C planning for ROBIN    Daughter-in-law Edelmira     89 yo F from home with PMH of CKD, dementia, recent COVID + 8/28 s/p paxlovid, presenting with s/p fall with left ankle pain found to have acute left distal fibula/lateral malleolus fracture. She was seen by orthopedics who recommended conservative management.     #Left acute distal fracture of fibula/lateral malleolus, s/p fall  #Gait imbalance  - Ortho following, recommended conservative management for fx  - WBAT with CAM boot   - Pain mgmt PRN  - Bilateral LE ultrasound negative for DVT  - PM&R eval appreciated, given prior functional status and living alone with stairs, feels that patient would be more appropriate for subacute rehab   - Dr. Sesay, physiatry updated the family - now amenable to ROBIN placement     #Gait imbalance, short term memory problems, r/o NPH vs AD  - Possible NPH on CT imaging, it is possible that her memory and gait imbalance could be from NPH as per neurology   - Per family she had a MRI 1/23 at [facility] and also showed "fluid in the brain"  - B12/folate/TSH/FT4 in normal range  - f/u MRI brain   - If MRI show NPH, consider consult neurosx.   - Neuro consulted, following     #Recent COVID  - Diagnosed 8/28 by [facility], completed paxlovid  - Still testing + for COVID; asymptomatic  - Repeat PCR 9/9 - positive  - Discussed with Infection Control, patient is >10 days since first positive on 8/28, isolation was discontinued     #Hypomag (Resolved)  - Monitor level    #HTN  #HLD  - Continue bystolic and atorvastatin   - Monitor vitals  - Will start amlodipine 2.5 mg daily as BP slightly elevated     #Hx of CKD  - Cr 1.4 range in 1/2023, at baseline  - Avoid nephrotoxic agents   - Monitor creatinine    #Onychomycosis  - Podiatry evaluated pt and she had bedside nail debridement, f/u as an outpt for continued nail care    #DVT ppx   - Lovenox    GOC: Full Code     DISPO: Pending MRI, then D/C planning for [facility]    9/16: Updated patient's son Guillermo at bedside ([phone])

## 2023-09-17 ENCOUNTER — TRANSCRIPTION ENCOUNTER (OUTPATIENT)
Age: 88
End: 2023-09-17

## 2023-09-17 LAB
ANION GAP SERPL CALC-SCNC: 5 MMOL/L — SIGNIFICANT CHANGE UP (ref 5–17)
BUN SERPL-MCNC: 49 MG/DL — HIGH (ref 7–23)
CALCIUM SERPL-MCNC: 10 MG/DL — SIGNIFICANT CHANGE UP (ref 8.4–10.5)
CHLORIDE SERPL-SCNC: 106 MMOL/L — SIGNIFICANT CHANGE UP (ref 96–108)
CO2 SERPL-SCNC: 27 MMOL/L — SIGNIFICANT CHANGE UP (ref 22–31)
CREAT SERPL-MCNC: 1.62 MG/DL — HIGH (ref 0.5–1.3)
EGFR: 30 ML/MIN/1.73M2 — LOW
GLUCOSE SERPL-MCNC: 96 MG/DL — SIGNIFICANT CHANGE UP (ref 70–99)
POTASSIUM SERPL-MCNC: 5.3 MMOL/L — SIGNIFICANT CHANGE UP (ref 3.5–5.3)
POTASSIUM SERPL-SCNC: 5.3 MMOL/L — SIGNIFICANT CHANGE UP (ref 3.5–5.3)
SODIUM SERPL-SCNC: 138 MMOL/L — SIGNIFICANT CHANGE UP (ref 135–145)

## 2023-09-17 PROCEDURE — 99232 SBSQ HOSP IP/OBS MODERATE 35: CPT

## 2023-09-17 RX ORDER — SODIUM CHLORIDE 9 MG/ML
1000 INJECTION, SOLUTION INTRAVENOUS
Refills: 0 | Status: DISCONTINUED | OUTPATIENT
Start: 2023-09-17 | End: 2023-09-18

## 2023-09-17 RX ADMIN — NEBIVOLOL HYDROCHLORIDE 5 MILLIGRAM(S): 5 TABLET ORAL at 05:31

## 2023-09-17 RX ADMIN — AMLODIPINE BESYLATE 2.5 MILLIGRAM(S): 2.5 TABLET ORAL at 05:32

## 2023-09-17 RX ADMIN — ENOXAPARIN SODIUM 40 MILLIGRAM(S): 100 INJECTION SUBCUTANEOUS at 05:31

## 2023-09-17 RX ADMIN — SODIUM CHLORIDE 60 MILLILITER(S): 9 INJECTION, SOLUTION INTRAVENOUS at 11:30

## 2023-09-17 RX ADMIN — ATORVASTATIN CALCIUM 10 MILLIGRAM(S): 80 TABLET, FILM COATED ORAL at 21:19

## 2023-09-17 NOTE — DISCHARGE NOTE PROVIDER - CARE PROVIDER_API CALL
Yordan Armstrong  Neurosurgery  [address], [address]  [place] [address]  Phone: [phone]  Fax: [phone]  Follow Up Time:     Ignacio Arvizu  Podiatric Medicine and Surgery  Phone: [phone]  Fax: ()-  Follow Up Time:     Reuben Ceballos  Orthopaedic Surgery  [address], [address]  [place] [address]  Phone: [phone]  Fax: [phone]  Follow Up Time:

## 2023-09-17 NOTE — DISCHARGE NOTE PROVIDER - PROVIDER TOKENS
PROVIDER:[TOKEN:[[id number]]],PROVIDER:[TOKEN:[[id number]]],PROVIDER:[TOKEN:[[id number]]]

## 2023-09-17 NOTE — PROGRESS NOTE ADULT - SUBJECTIVE AND OBJECTIVE BOX
Patient is a 88y old  Female who presents with a chief complaint of fall (17 Sep 2023 07:34)    Patient seen and examined at bedside. No overnight events reported.     ALLERGIES:  chocolate (Hives)  sulfa drugs (Unknown)    MEDICATIONS  (STANDING):  amLODIPine   Tablet 2.5 milliGRAM(s) Oral daily  atorvastatin 10 milliGRAM(s) Oral at bedtime  enoxaparin Injectable 40 milliGRAM(s) SubCutaneous every 24 hours  nebivolol 5 milliGRAM(s) Oral daily    MEDICATIONS  (PRN):  acetaminophen     Tablet .. 650 milliGRAM(s) Oral every 6 hours PRN Temp greater or equal to 38C (100.4F), Mild Pain (1 - 3)  aluminum hydroxide/magnesium hydroxide/simethicone Suspension 30 milliLiter(s) Oral every 4 hours PRN Dyspepsia  melatonin 3 milliGRAM(s) Oral at bedtime PRN Insomnia  ondansetron Injectable 4 milliGRAM(s) IV Push every 8 hours PRN Nausea and/or Vomiting    Vital Signs Last 24 Hrs  T(F): 97.9 (17 Sep 2023 05:18), Max: 98.2 (16 Sep 2023 21:06)  HR: 68 (17 Sep 2023 05:18) (68 - 87)  BP: 151/65 (17 Sep 2023 05:18) (120/71 - 151/65)  RR: 17 (17 Sep 2023 05:18) (16 - 17)  SpO2: 96% (17 Sep 2023 05:18) (96% - 97%)    I&O's Summary  16 Sep 2023 07:01  -  17 Sep 2023 07:00  --------------------------------------------------------  IN: 0 mL / OUT: 500 mL / NET: -500 mL    PHYSICAL EXAM:  General: NAD, alert, pleasantly confused   ENT: No gross hearing impairment, Moist mucous membranes, no thrush  Neck: Supple, No JVD  Lungs: Clear to auscultation bilaterally, good air entry, non-labored breathing  Cardio: RRR, S1/S2, No murmur  Abdomen: Soft, Nontender, Nondistended; Bowel sounds present  Extremities: No calf tenderness, No cyanosis, RLE trace edema, right thigh larger than left thigh, LLE in CAM boot, sensation/motor intact   Psych: Appropriate mood and affect    LABS:                        11.4   8.07  )-----------( 268      ( 15 Sep 2023 05:45 )             35.1     09-16    139  |  106  |  48  ----------------------------<  95  5.2   |  26  |  1.41    Ca    10.1      16 Sep 2023 06:34      Urinalysis Basic - ( 16 Sep 2023 06:34 )    Color: x / Appearance: x / SG: x / pH: x  Gluc: 95 mg/dL / Ketone: x  / Bili: x / Urobili: x   Blood: x / Protein: x / Nitrite: x   Leuk Esterase: x / RBC: x / WBC x   Sq Epi: x / Non Sq Epi: x / Bacteria: x    COVID-19 PCR: Detected (09-09-23 @ 14:00)  COVID-19 PCR: Detected (09-06-23 @ 16:20)    RADIOLOGY & ADDITIONAL TESTS:    Care Discussed with Consultants/Other Providers:

## 2023-09-17 NOTE — DISCHARGE NOTE PROVIDER - HOSPITAL COURSE
Hospital Course  HPI:  89 yo F from home with PMH of CKD, dementia, recent COVID + 8/28 s/p paxlovid, presented s/p fall with left ankle pain. She was found to have acute left distal fibula/lateral malleolus fracture. She was seen by orthopedics who recommended conservative management. She is cleared for WBAT with CAM boot. Bilateral LE ultrasound was completed due to leg swelling and was negative for DVT bilaterally.     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 Family reports patient has had short term memory problem and balance issues for about 2 years. Per family she had a MRI 1/23 at [facility] and also showed "fluid in the brain" and no surgery was recommended at that time - repeat imaging at 6 months recommended. Neuro was consulted for abnormal head CT, recommended MRI which showed __________    While admitted patient had slightly high blood pressures, was started on amlodipine 2.5 mg daily with improvement. Also was noted to have NARDA, treated with IVF. She was seen by podiatry for onychomycosis, podiatry evaluated pt and she had bedside nail debridement with plan for f/u as an outpt for continued nail care    PT recommended ROBIN            You were admitted for   You were diagnosed with   You were treated with   You were prescribed the following new medications:    You will need to follow up with your primary care physician.    Source of Infection:  Antibiotic / Last Day:    Palliative Care / Advanced Care Planning  Code Status:  Patient/Family agreeable to Hospice/Palliative (Y/N)?  Summary of Goals of Care Conversation:    Discharging Provider:    Contact Info: Cell 				- Please call with any questions or concerns.    Outpatient Provider:    Signout given to  SNF Provider:   Hospital Course  HPI:  87 yo F from home with PMH of CKD, dementia, recent COVID + 8/28 s/p paxlovid, presented s/p fall with left ankle pain. She was found to have acute left distal fibula/lateral malleolus fracture. She was seen by orthopedics who recommended conservative management. She is cleared for WBAT with CAM boot. Bilateral LE ultrasound was completed due to leg swelling and was negative for DVT bilaterally.     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 Family reports patient has had short term memory problem and balance issues for about 2 years. Per family she had a MRI 1/23 at [facility] and also showed "fluid in the brain" and no surgery was recommended at that time - repeat imaging at 6 months recommended. Neuro was consulted for abnormal head CT, recommended MRI with results shown below. Discussed results with Dr. Duarte, recommended outpatient follow up with neuro surgeon, Dr. Armstrong.     While admitted patient had slightly high blood pressures, was started on amlodipine 2.5 mg daily with improvement. Also was noted to have NARDA, treated with IVF. She was seen by podiatry for onychomycosis, podiatry evaluated pt and she had bedside nail debridement with plan for f/u as an outpt for continued nail care    PT recommended ROBIN    < from: MR Head No Cont (09.18.23 @ 11:15) >    IMPRESSION:    No acute intracranial abnormality.    Moderate generalized cerebral volume loss is noted. Moderate dilatation   of the lateral and third ventricles is noted, mildly out of proportion to   the degree of cerebral volume loss. Differential considerations include   central greater than cortical atrophy or normal pressure hydrocephalus.    Right maxillary, ethmoid, frontal sinus opacification suggesting   sinusitis.      You were admitted for ankle pain  You were diagnosed with ankle fracture  You were treated with conservative management, WBAT with CAM boot   You were prescribed the following new medications:    You will need to follow up with your primary care physician.      Discharging Provider:  Bulmaro FLORES  Contact Info: [phone]- Please call with any questions or concerns.     Hospital Course  HPI:  87 yo F from home with PMH of CKD, dementia, recent COVID + 8/28 s/p paxlovid, presented s/p fall with left ankle pain. She was found to have acute left distal fibula/lateral malleolus fracture. She was seen by orthopedics who recommended conservative management. She is cleared for WBAT with CAM boot. Bilateral LE ultrasound was completed due to leg swelling and was negative for DVT bilaterally.     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 Family reports patient has had short term memory problem and balance issues for about 2 years. Per family she had a MRI 1/23 at [facility] and also showed "fluid in the brain" and no surgery was recommended at that time - repeat imaging at 6 months recommended. Neuro was consulted for abnormal head CT, recommended MRI with results shown below. Discussed results with Dr. Duarte, recommended outpatient follow up with neuro surgeon, Dr. Armstrong.     While admitted patient had slightly high blood pressures, was started on amlodipine 2.5 mg daily with improvement. Also was noted to have NARDA, treated with IVF. She was seen by podiatry for onychomycosis, podiatry evaluated pt and she had bedside nail debridement with plan for f/u as an outpt for continued nail care    PT recommended ROBIN    < from: MR Head No Cont (09.18.23 @ 11:15) >    IMPRESSION:    No acute intracranial abnormality.    Moderate generalized cerebral volume loss is noted. Moderate dilatation   of the lateral and third ventricles is noted, mildly out of proportion to   the degree of cerebral volume loss. Differential considerations include   central greater than cortical atrophy or normal pressure hydrocephalus.    Right maxillary, ethmoid, frontal sinus opacification suggesting   sinusitis.      You were admitted for ankle pain  You were diagnosed with ankle fracture  You were treated with conservative management, WBAT with CAM boot   You were prescribed the following new medications:    You will need to follow up with your primary care physician.    Discharging Provider:  Bulmaro FLORES  Contact Info: [phone]- Please call with any questions or concerns.

## 2023-09-17 NOTE — DISCHARGE NOTE PROVIDER - NSDCFUSCHEDAPPT_GEN_ALL_CORE_FT
[facility] Physician [facility]  MRIIP OP 10 [facility]  Scheduled Appointment: 09/18/2023

## 2023-09-17 NOTE — PROGRESS NOTE ADULT - ASSESSMENT
87 yo F from home with PMH of CKD, dementia, recent COVID + 8/28 s/p paxlovid, presenting with s/p fall with left ankle pain found to have acute left distal fibula/lateral malleolus fracture. She was seen by orthopedics who recommended conservative management.     #Left acute distal fracture of fibula/lateral malleolus, s/p fall  #Gait imbalance  - Ortho following, recommended conservative management for fx  - WBAT with CAM boot   - Pain mgmt PRN  - Bilateral LE ultrasound negative for DVT  - PM&R eval appreciated, given prior functional status and living alone with stairs, feels that patient would be more appropriate for subacute rehab   - Dr. Sesay, physiatry updated the family - now amenable to ROBIN placement     #Gait imbalance, short term memory problems, r/o NPH vs AD  - Possible NPH on CT imaging, it is possible that her memory and gait imbalance could be from NPH as per neurology   - Per family she had a MRI 1/23 at [facility] and also showed "fluid in the brain"  - B12/folate/TSH/FT4 in normal range  - f/u MRI brain, paperwork completed and in chart   - If MRI show NPH, consider consult neurosx.   - Neuro consulted, following     #Recent COVID  - Diagnosed 8/28 by [facility], completed paxlovid  - Still testing + for COVID; asymptomatic  - Repeat PCR 9/9 - positive  - Discussed with Infection Control, patient is >10 days since first positive on 8/28, isolation was discontinued     #Hypomag (Resolved)  - Monitor level    #HTN  #HLD  - Continue bystolic and atorvastatin   - Monitor vitals  - Started amlodipine 2.5 mg daily as BP slightly elevated     #Hx of CKD  - Cr 1.4 range in 1/2023, at baseline  - Avoid nephrotoxic agents   - Monitor creatinine    #Onychomycosis  - Podiatry evaluated pt and she had bedside nail debridement, f/u as an outpt for continued nail care    #DVT ppx   - Lovenox    GOC: Full Code     DISPO: Pending MRI, then D/C planning for ROBIN    9/16: Updated patient's son Guillermo at bedside ([phone])    89 yo F from home with PMH of CKD, dementia, recent COVID + 8/28 s/p paxlovid, presenting with s/p fall with left ankle pain found to have acute left distal fibula/lateral malleolus fracture. She was seen by orthopedics who recommended conservative management.     #Left acute distal fracture of fibula/lateral malleolus, s/p fall  #Gait imbalance  - Ortho following, recommended conservative management for fx  - WBAT with CAM boot   - Pain mgmt PRN  - Bilateral LE ultrasound negative for DVT  - PM&R eval appreciated, given prior functional status and living alone with stairs, feels that patient would be more appropriate for subacute rehab   - Dr. Sesay, physiatry updated the family - now amenable to ROBIN placement     #Gait imbalance, short term memory problems, r/o NPH vs AD  - Possible NPH on CT imaging, it is possible that her memory and gait imbalance could be from NPH as per neurology   - Per family she had a MRI 1/23 at [facility] and also showed "fluid in the brain"  - B12/folate/TSH/FT4 in normal range  - f/u MRI brain, paperwork completed and in chart   - If MRI show NPH, consider consult neurosx.   - Neuro consulted, following     #Recent COVID  - Diagnosed 8/28 by [facility], completed paxlovid  - Still testing + for COVID; asymptomatic  - Repeat PCR 9/9 - positive  - Discussed with Infection Control, patient is >10 days since first positive on 8/28, isolation was discontinued     #Hypomag (Resolved)  - Monitor level    #HTN  #HLD  - Continue bystolic and atorvastatin   - Monitor vitals  - Started amlodipine 2.5 mg daily as BP slightly elevated     #Hx of CKD  - Cr 1.4 range in 1/2023, at baseline  - Avoid nephrotoxic agents   - Monitor creatinine    #Onychomycosis  - Podiatry evaluated pt and she had bedside nail debridement, f/u as an outpt for continued nail care    #DVT ppx   - Lovenox    GOC: Full Code     DISPO: Pending MRI, then D/C planning for ROBIN    9/17: Updated patient's daughter-in-law Edelmira at bedside - Edelmira requests phone call in morning with time of MRI so she can coordinate visit (Edelmira - [phone])

## 2023-09-17 NOTE — DISCHARGE NOTE PROVIDER - NSDCMRMEDTOKEN_GEN_ALL_CORE_FT
atorvastatin 10 mg oral tablet: 1 orally once a day  Bystolic 5 mg oral tablet: 1 orally once a day  cam walker: WBAT left leg with cam walker for non displace ankle fracture MDD: 1  prevagen:   vitamin d3 50 mg:    amLODIPine 2.5 mg oral tablet: 1 tab(s) orally once a day  atorvastatin 10 mg oral tablet: 1 orally once a day  Bystolic 5 mg oral tablet: 1 orally once a day  cam walker: WBAT left leg with cam walker for non displace ankle fracture MDD: 1  enoxaparin: 40 milligram(s) subcutaneous once a day  prevagen:   vitamin d3 50 mg:

## 2023-09-17 NOTE — DISCHARGE NOTE PROVIDER - NSDCCPCAREPLAN_GEN_ALL_CORE_FT
PRINCIPAL DISCHARGE DIAGNOSIS  Diagnosis: Closed fracture of left distal fibula  Assessment and Plan of Treatment: You were admitted for ankle pain  You were diagnosed with ankle fracture  You were treated with conservative management, WBAT with CAM boot   Follow up with primary care provider      SECONDARY DISCHARGE DIAGNOSES  Diagnosis: History of memory loss  Assessment and Plan of Treatment: -You were evaluated by neurology and an MRI was recommended  -Results were discussed with neurologist Dr. Duarte, and follow up with neurosurgeon outpatient is recommended

## 2023-09-18 ENCOUNTER — APPOINTMENT (OUTPATIENT)
Dept: MRI IMAGING | Facility: HOSPITAL | Age: 88
End: 2023-09-18

## 2023-09-18 LAB
ANION GAP SERPL CALC-SCNC: 8 MMOL/L — SIGNIFICANT CHANGE UP (ref 5–17)
BUN SERPL-MCNC: 46 MG/DL — HIGH (ref 7–23)
CALCIUM SERPL-MCNC: 9.8 MG/DL — SIGNIFICANT CHANGE UP (ref 8.4–10.5)
CHLORIDE SERPL-SCNC: 106 MMOL/L — SIGNIFICANT CHANGE UP (ref 96–108)
CO2 SERPL-SCNC: 25 MMOL/L — SIGNIFICANT CHANGE UP (ref 22–31)
CREAT SERPL-MCNC: 1.4 MG/DL — HIGH (ref 0.5–1.3)
EGFR: 36 ML/MIN/1.73M2 — LOW
GLUCOSE SERPL-MCNC: 97 MG/DL — SIGNIFICANT CHANGE UP (ref 70–99)
HCT VFR BLD CALC: 33.4 % — LOW (ref 34.5–45)
HGB BLD-MCNC: 10.9 G/DL — LOW (ref 11.5–15.5)
MCHC RBC-ENTMCNC: 29.6 PG — SIGNIFICANT CHANGE UP (ref 27–34)
MCHC RBC-ENTMCNC: 32.6 GM/DL — SIGNIFICANT CHANGE UP (ref 32–36)
MCV RBC AUTO: 90.8 FL — SIGNIFICANT CHANGE UP (ref 80–100)
NRBC # BLD: 0 /100 WBCS — SIGNIFICANT CHANGE UP (ref 0–0)
PLATELET # BLD AUTO: 243 K/UL — SIGNIFICANT CHANGE UP (ref 150–400)
POTASSIUM SERPL-MCNC: 5.2 MMOL/L — SIGNIFICANT CHANGE UP (ref 3.5–5.3)
POTASSIUM SERPL-SCNC: 5.2 MMOL/L — SIGNIFICANT CHANGE UP (ref 3.5–5.3)
RBC # BLD: 3.68 M/UL — LOW (ref 3.8–5.2)
RBC # FLD: 13.3 % — SIGNIFICANT CHANGE UP (ref 10.3–14.5)
SODIUM SERPL-SCNC: 139 MMOL/L — SIGNIFICANT CHANGE UP (ref 135–145)
WBC # BLD: 6.73 K/UL — SIGNIFICANT CHANGE UP (ref 3.8–10.5)
WBC # FLD AUTO: 6.73 K/UL — SIGNIFICANT CHANGE UP (ref 3.8–10.5)

## 2023-09-18 PROCEDURE — 70551 MRI BRAIN STEM W/O DYE: CPT | Mod: 26

## 2023-09-18 PROCEDURE — 99232 SBSQ HOSP IP/OBS MODERATE 35: CPT

## 2023-09-18 RX ORDER — ENOXAPARIN SODIUM 100 MG/ML
40 INJECTION SUBCUTANEOUS
Qty: 0 | Refills: 0 | DISCHARGE
Start: 2023-09-18

## 2023-09-18 RX ORDER — AMLODIPINE BESYLATE 2.5 MG/1
1 TABLET ORAL
Qty: 0 | Refills: 0 | DISCHARGE
Start: 2023-09-18

## 2023-09-18 RX ADMIN — ATORVASTATIN CALCIUM 10 MILLIGRAM(S): 80 TABLET, FILM COATED ORAL at 21:18

## 2023-09-18 RX ADMIN — AMLODIPINE BESYLATE 2.5 MILLIGRAM(S): 2.5 TABLET ORAL at 05:18

## 2023-09-18 RX ADMIN — ENOXAPARIN SODIUM 40 MILLIGRAM(S): 100 INJECTION SUBCUTANEOUS at 05:18

## 2023-09-18 RX ADMIN — NEBIVOLOL HYDROCHLORIDE 5 MILLIGRAM(S): 5 TABLET ORAL at 05:18

## 2023-09-18 NOTE — PROGRESS NOTE ADULT - SUBJECTIVE AND OBJECTIVE BOX
Patient is a 88y old Female who presents with a chief complaint of fall (17 Sep 2023 16:41)      Patient seen and examined at bedside. No overnight events reported.     ALLERGIES:  chocolate (Hives)  sulfa drugs (Unknown)    MEDICATIONS  (STANDING):  amLODIPine   Tablet 2.5 milliGRAM(s) Oral daily  atorvastatin 10 milliGRAM(s) Oral at bedtime  enoxaparin Injectable 40 milliGRAM(s) SubCutaneous every 24 hours  nebivolol 5 milliGRAM(s) Oral daily  sodium chloride 0.45%. 1000 milliLiter(s) (60 mL/Hr) IV Continuous <Continuous>    MEDICATIONS  (PRN):  acetaminophen     Tablet .. 650 milliGRAM(s) Oral every 6 hours PRN Temp greater or equal to 38C (100.4F), Mild Pain (1 - 3)  aluminum hydroxide/magnesium hydroxide/simethicone Suspension 30 milliLiter(s) Oral every 4 hours PRN Dyspepsia  melatonin 3 milliGRAM(s) Oral at bedtime PRN Insomnia  ondansetron Injectable 4 milliGRAM(s) IV Push every 8 hours PRN Nausea and/or Vomiting    Vital Signs Last 24 Hrs  T(F): 97.6 (18 Sep 2023 04:56), Max: 98.9 (17 Sep 2023 20:42)  HR: 67 (18 Sep 2023 04:56) (67 - 77)  BP: 161/72 (18 Sep 2023 04:56) (120/65 - 161/72)  RR: 16 (18 Sep 2023 04:56) (16 - 16)  SpO2: 98% (18 Sep 2023 04:56) (95% - 98%)  I&O's Summary    17 Sep 2023 07:01  -  18 Sep 2023 07:00  --------------------------------------------------------  IN: 200 mL / OUT: 900 mL / NET: -700 mL      PHYSICAL EXAM:  General: NAD, Awake, alert, pleasant   ENT: No gross hearing impairment, Moist mucous membranes, no thrush  Neck: Supple, No JVD  Lungs: Clear to auscultation bilaterally, good air entry, non-labored breathing  Cardio: RRR, S1/S2, No murmur  Abdomen: Soft, Nontender, Nondistended; Bowel sounds present  Extremities:  RLE trace edema, right thigh larger than left thigh, LLE in CAM boot, sensation/motor intact No calf tenderness, No cyanosis, No pitting edema    LABS:                        10.9   6.73  )-----------( 243      ( 18 Sep 2023 05:48 )             33.4     09-18    139  |  106  |  46  ----------------------------<  97  5.2   |  25  |  1.40    Ca    9.8      18 Sep 2023 05:48                                        Urinalysis Basic - ( 18 Sep 2023 05:48 )    Color: x / Appearance: x / SG: x / pH: x  Gluc: 97 mg/dL / Ketone: x  / Bili: x / Urobili: x   Blood: x / Protein: x / Nitrite: x   Leuk Esterase: x / RBC: x / WBC x   Sq Epi: x / Non Sq Epi: x / Bacteria: x        COVID-19 PCR: Detected (09-09-23 @ 14:00)  COVID-19 PCR: Detected (09-06-23 @ 16:20)    RADIOLOGY & ADDITIONAL TESTS:    Care Discussed with Consultants/Other Providers:    Patient is a 88y old Female who presents with a chief complaint of fall (17 Sep 2023 16:41)    Patient seen and examined at bedside. No overnight events reported.     ALLERGIES:  chocolate (Hives)  sulfa drugs (Unknown)    MEDICATIONS  (STANDING):  amLODIPine   Tablet 2.5 milliGRAM(s) Oral daily  atorvastatin 10 milliGRAM(s) Oral at bedtime  enoxaparin Injectable 40 milliGRAM(s) SubCutaneous every 24 hours  nebivolol 5 milliGRAM(s) Oral daily  sodium chloride 0.45%. 1000 milliLiter(s) (60 mL/Hr) IV Continuous <Continuous>    MEDICATIONS  (PRN):  acetaminophen     Tablet .. 650 milliGRAM(s) Oral every 6 hours PRN Temp greater or equal to 38C (100.4F), Mild Pain (1 - 3)  aluminum hydroxide/magnesium hydroxide/simethicone Suspension 30 milliLiter(s) Oral every 4 hours PRN Dyspepsia  melatonin 3 milliGRAM(s) Oral at bedtime PRN Insomnia  ondansetron Injectable 4 milliGRAM(s) IV Push every 8 hours PRN Nausea and/or Vomiting    Vital Signs Last 24 Hrs  T(F): 97.6 (18 Sep 2023 04:56), Max: 98.9 (17 Sep 2023 20:42)  HR: 67 (18 Sep 2023 04:56) (67 - 77)  BP: 161/72 (18 Sep 2023 04:56) (120/65 - 161/72)  RR: 16 (18 Sep 2023 04:56) (16 - 16)  SpO2: 98% (18 Sep 2023 04:56) (95% - 98%)  I&O's Summary    17 Sep 2023 07:01  -  18 Sep 2023 07:00  --------------------------------------------------------  IN: 200 mL / OUT: 900 mL / NET: -700 mL      PHYSICAL EXAM:  General: NAD, Awake, alert, pleasant   ENT: No gross hearing impairment, Moist mucous membranes, no thrush  Neck: Supple, No JVD  Lungs: Clear to auscultation bilaterally, good air entry, non-labored breathing  Cardio: RRR, S1/S2, No murmur  Abdomen: Soft, Nontender, Nondistended; Bowel sounds present  Extremities:  RLE trace edema, right thigh larger than left thigh, LLE in CAM boot, sensation/motor intact No calf tenderness, No cyanosis, No pitting edema    LABS:                        10.9   6.73  )-----------( 243      ( 18 Sep 2023 05:48 )             33.4     09-18    139  |  106  |  46  ----------------------------<  97  5.2   |  25  |  1.40    Ca    9.8      18 Sep 2023 05:48                                        Urinalysis Basic - ( 18 Sep 2023 05:48 )    Color: x / Appearance: x / SG: x / pH: x  Gluc: 97 mg/dL / Ketone: x  / Bili: x / Urobili: x   Blood: x / Protein: x / Nitrite: x   Leuk Esterase: x / RBC: x / WBC x   Sq Epi: x / Non Sq Epi: x / Bacteria: x        COVID-19 PCR: Detected (09-09-23 @ 14:00)  COVID-19 PCR: Detected (09-06-23 @ 16:20)    RADIOLOGY & ADDITIONAL TESTS:    Care Discussed with Consultants/Other Providers:

## 2023-09-18 NOTE — CHART NOTE - NSCHARTNOTEFT_GEN_A_CORE
NUTRITION FOLLOW UP    SOURCE: Patient [X)   Family [ ]    Medical Record (X)    Diet, Regular (09-06-23 @ 16:30) [Active]      Pt s/p MRI today, endorses hunger for lunch. Pt states that she has been eating consistently well, enjoying meals in-house. PO % per nursing documentation. Denies N/V/D, constipation. Last BM 9/17. No nutrition related concerns.     CURRENT WEIGHT: 180.9lbs (9/18)  179.8lbs (9/07)    PERTINENT MEDS:   Pertinent Medications: MEDICATIONS  (STANDING):  amLODIPine   Tablet 2.5 milliGRAM(s) Oral daily  atorvastatin 10 milliGRAM(s) Oral at bedtime  enoxaparin Injectable 40 milliGRAM(s) SubCutaneous every 24 hours  nebivolol 5 milliGRAM(s) Oral daily    MEDICATIONS  (PRN):  acetaminophen     Tablet .. 650 milliGRAM(s) Oral every 6 hours PRN Temp greater or equal to 38C (100.4F), Mild Pain (1 - 3)  aluminum hydroxide/magnesium hydroxide/simethicone Suspension 30 milliLiter(s) Oral every 4 hours PRN Dyspepsia  melatonin 3 milliGRAM(s) Oral at bedtime PRN Insomnia  ondansetron Injectable 4 milliGRAM(s) IV Push every 8 hours PRN Nausea and/or Vomiting      PERTINENT LABS:  09-18 Na139 mmol/L Glu 97 mg/dL K+ 5.2 mmol/L Cr  1.40 mg/dL<H> BUN 46 mg/dL<H>      SKIN:  no pressure ulcers  EDEMA: none  LAST BM: 9/17    ESTIMATED NEEDS:   [X] no change since previous assessment  [ ] recalculated:     PREVIOUS NUTRITION DIAGNOSIS:  N/A    NUTRITION DIAGNOSIS is : N/A    NEW NUTRITION DIAGNOSIS: N/A    NUTRITION RECOMMENDATIONS:   1. Continue current nutrition plan of care     MONITORING AND EVALUATION:   1. Tolerance to diet prescription   2. PO intake  3. Weights  4. Labs  5. Follow Up per protocol     RD to remain available   Catarina Sen RDN   [phone] or TEAMS
Patient needs manual wheelchair inside the home secondary to Left acute distal fracture of fibula/lateral malleolus.  The patient has a mobility limitation that significantly impairs her ability to participate in mobility related activity of daily living such as toileting, feeding, dressing, grooming and bathing in customary locations in the home. Use of the chair will significantly improve the ability to participate in MRADL's in the home.    Footrest/legrest: patient has a presence of brace/CAM boot.
Pt seen at bedside to be fitted with camboot for left fibula fx. from fall  Splint was not removed, boot sized for contralateral side.  Boot left at bedside. Splint to be removed prior to donning of camboot.  Follow up if needed. [facility]  [phone]
ortho Note:      Patient was seen and examined by me yesterday and today.   Patient sitting up in chair , daughter in law by her side.  Patient admitted 9 days ago s/p fall and found to have a left stable left ankle fracture.      Case and consult discussed with Dr Ceballos.    Patient was seen by Dr Ceballos and the plan has been discussed with patient and family.  Patient is in a cam boot with a non displaced left ankle fracture.    Prior to being discharged to [facility], a repeat left ankle xray was repeated to determine if change in therapy necessary.   Patient and family state that she is having difficulty in walking .  The xray was repeated with the below findings.    < from: Xray Ankle Complete 3 Views, Left (09.15.23 @ 11:21) >     FINDINGS:   Essentially nondisplaced fracture distal fibula. No significant change   in comparison to the prior study. Evidence of soft tissue swelling and a   plantar calcaneal spur. Degenerative change involving the midfoot   articulations.      IMPRESSION:    Nondisplaced distal fibular fracture, essentially unchanged in comparison   to the prior study.    --- End of Report ---  < end of copied text >    I have advised Dr Ceballos of findings and have informed patient and family that there is no change in Left ankle fracture.      On exam:   Left ankle with less swelling, no break in the skin, + EHL/FHL + pulses.   Podiatry saw patient yesterday for treatment of severe onchomycosis.       Plan:  Continue WBAT left ankle with cam boot.           DVT prophylaxis            Rehab when medically stable               Follow up with Dr Ceballos when discharged from [facility]

## 2023-09-18 NOTE — PROGRESS NOTE ADULT - ASSESSMENT
87 yo F from home with PMH of CKD, dementia, recent COVID + 8/28 s/p paxlovid, presenting with s/p fall with left ankle pain found to have acute left distal fibula/lateral malleolus fracture. She was seen by orthopedics who recommended conservative management.     #Left acute distal fracture of fibula/lateral malleolus, s/p fall  #Gait imbalance  - Ortho following, recommended conservative management for fx  - WBAT with CAM boot   - Pain mgmt PRN  - Bilateral LE ultrasound negative for DVT  - PM&R eval appreciated, given prior functional status and living alone with stairs, feels that patient would be more appropriate for subacute rehab   - Dr. Sesay, physiatry updated the family - now amenable to [facility] placement     #Gait imbalance, short term memory problems, r/o NPH vs AD  - Possible NPH on CT imaging, it is possible that her memory and gait imbalance could be from NPH as per neurology   - Per family she had a MRI 1/23 at [facility] and also showed "fluid in the brain"  - B12/folate/TSH/FT4 in normal range  - f/u MRI brain, paperwork completed and in chart, scheduled for today  - If MRI show NPH, consider consult neurosx.   - Neuro consulted, following     #Recent COVID  - Diagnosed 8/28 by [facility], completed paxlovid  - Still testing + for COVID; asymptomatic  - Repeat PCR 9/9 - positive  - Discussed with Infection Control, patient is >10 days since first positive on 8/28, isolation was discontinued     #Hypomag (Resolved)  - Monitor level    #HTN  #HLD  - Continue bystolic and atorvastatin   - Monitor vitals  - Started amlodipine 2.5 mg daily as BP slightly elevated     #Hx of CKD  - Cr 1.4 range in 1/2023, at baseline  - Avoid nephrotoxic agents   - Monitor creatinine    #Onychomycosis  - Podiatry evaluated pt and she had bedside nail debridement, f/u as an outpt for continued nail care    #DVT ppx   - Lovenox    GOC: Full Code     DISPO: Pending MRI, then D/C planning for [facility]    9/17: Updated patient's daughter-in-law Edelmira at bedside - Edelmira requests phone call in morning with time of MRI so she can coordinate visit (Edelmira - [phone])   89 yo F from home with PMH of CKD, dementia, recent COVID + 8/28 s/p paxlovid, presenting with s/p fall with left ankle pain found to have acute left distal fibula/lateral malleolus fracture. She was seen by orthopedics who recommended conservative management.     #Left acute distal fracture of fibula/lateral malleolus, s/p fall  #Gait imbalance  - Ortho following, recommended conservative management for fx  - WBAT with CAM boot   - Pain mgmt PRN  - Bilateral LE ultrasound negative for DVT  - PM&R eval appreciated, given prior functional status and living alone with stairs, feels that patient would be more appropriate for subacute rehab   - Dr. Sesay, physiatry updated the family - now amenable to [facility] placement     #Gait imbalance, short term memory problems, r/o NPH vs AD  - Possible NPH on CT imaging, it is possible that her memory and gait imbalance could be from NPH as per neurology   - Per family she had a MRI 1/23 at [facility] and also showed "fluid in the brain"  - B12/folate/TSH/FT4 in normal range  - f/u MRI brain, paperwork completed and in chart, scheduled for today  - If MRI show NPH, consider consult neurosx.   - Neuro consulted, following     #Recent COVID  - Diagnosed 8/28 by [facility], completed paxlovid  - Still testing + for COVID; asymptomatic  - Repeat PCR 9/9 - positive  - Discussed with Infection Control, patient is >10 days since first positive on 8/28, isolation was discontinued     #Hypomag (Resolved)  - Monitor level    #HTN  #HLD  - Continue bystolic and atorvastatin   - Monitor vitals  - Started amlodipine 2.5 mg daily as BP slightly elevated     #Hx of CKD  - Cr 1.4 range in 1/2023, at baseline  - Avoid nephrotoxic agents   - Monitor creatinine    #Onychomycosis  - Podiatry evaluated pt and she had bedside nail debridement, f/u as an outpt for continued nail care    #DVT ppx   - Lovenox    GOC: Full Code     DISPO: Pending MRI, then D/C planning for [facility]    9/18: Updated patient's daughter-in-law Edelmira at bedside (Edelmira - [phone])

## 2023-09-19 ENCOUNTER — TRANSCRIPTION ENCOUNTER (OUTPATIENT)
Age: 88
End: 2023-09-19

## 2023-09-19 VITALS
TEMPERATURE: 98 F | DIASTOLIC BLOOD PRESSURE: 57 MMHG | RESPIRATION RATE: 16 BRPM | OXYGEN SATURATION: 97 % | SYSTOLIC BLOOD PRESSURE: 142 MMHG | HEART RATE: 66 BPM

## 2023-09-19 PROCEDURE — 97166 OT EVAL MOD COMPLEX 45 MIN: CPT

## 2023-09-19 PROCEDURE — 29515 APPLICATION SHORT LEG SPLINT: CPT

## 2023-09-19 PROCEDURE — 81001 URINALYSIS AUTO W/SCOPE: CPT

## 2023-09-19 PROCEDURE — 70551 MRI BRAIN STEM W/O DYE: CPT

## 2023-09-19 PROCEDURE — 97530 THERAPEUTIC ACTIVITIES: CPT

## 2023-09-19 PROCEDURE — 36415 COLL VENOUS BLD VENIPUNCTURE: CPT

## 2023-09-19 PROCEDURE — 87086 URINE CULTURE/COLONY COUNT: CPT

## 2023-09-19 PROCEDURE — 85027 COMPLETE CBC AUTOMATED: CPT

## 2023-09-19 PROCEDURE — 83735 ASSAY OF MAGNESIUM: CPT

## 2023-09-19 PROCEDURE — 97535 SELF CARE MNGMENT TRAINING: CPT

## 2023-09-19 PROCEDURE — 87635 SARS-COV-2 COVID-19 AMP PRB: CPT

## 2023-09-19 PROCEDURE — 70450 CT HEAD/BRAIN W/O DYE: CPT | Mod: MA

## 2023-09-19 PROCEDURE — 73610 X-RAY EXAM OF ANKLE: CPT

## 2023-09-19 PROCEDURE — 99239 HOSP IP/OBS DSCHRG MGMT >30: CPT

## 2023-09-19 PROCEDURE — 73630 X-RAY EXAM OF FOOT: CPT

## 2023-09-19 PROCEDURE — 99285 EMERGENCY DEPT VISIT HI MDM: CPT | Mod: 25

## 2023-09-19 PROCEDURE — 93970 EXTREMITY STUDY: CPT

## 2023-09-19 PROCEDURE — 97116 GAIT TRAINING THERAPY: CPT

## 2023-09-19 PROCEDURE — 84439 ASSAY OF FREE THYROXINE: CPT

## 2023-09-19 PROCEDURE — 97162 PT EVAL MOD COMPLEX 30 MIN: CPT

## 2023-09-19 PROCEDURE — 82607 VITAMIN B-12: CPT

## 2023-09-19 PROCEDURE — 80053 COMPREHEN METABOLIC PANEL: CPT

## 2023-09-19 PROCEDURE — 85025 COMPLETE CBC W/AUTO DIFF WBC: CPT

## 2023-09-19 PROCEDURE — 82746 ASSAY OF FOLIC ACID SERUM: CPT

## 2023-09-19 PROCEDURE — 71045 X-RAY EXAM CHEST 1 VIEW: CPT

## 2023-09-19 PROCEDURE — 97110 THERAPEUTIC EXERCISES: CPT

## 2023-09-19 PROCEDURE — 84443 ASSAY THYROID STIM HORMONE: CPT

## 2023-09-19 PROCEDURE — 80048 BASIC METABOLIC PNL TOTAL CA: CPT

## 2023-09-19 PROCEDURE — 93005 ELECTROCARDIOGRAM TRACING: CPT

## 2023-09-19 RX ADMIN — NEBIVOLOL HYDROCHLORIDE 5 MILLIGRAM(S): 5 TABLET ORAL at 05:31

## 2023-09-19 RX ADMIN — AMLODIPINE BESYLATE 2.5 MILLIGRAM(S): 2.5 TABLET ORAL at 05:32

## 2023-09-19 RX ADMIN — ENOXAPARIN SODIUM 40 MILLIGRAM(S): 100 INJECTION SUBCUTANEOUS at 05:31

## 2023-09-19 NOTE — PROGRESS NOTE ADULT - SUBJECTIVE AND OBJECTIVE BOX
Patient is a 88y old  Female who presents with a chief complaint of fall (18 Sep 2023 09:32)      Patient seen and examined at bedside. No overnight events reported.     ALLERGIES:  chocolate (Hives)  sulfa drugs (Unknown)    MEDICATIONS  (STANDING):  amLODIPine   Tablet 2.5 milliGRAM(s) Oral daily  atorvastatin 10 milliGRAM(s) Oral at bedtime  enoxaparin Injectable 40 milliGRAM(s) SubCutaneous every 24 hours  nebivolol 5 milliGRAM(s) Oral daily    MEDICATIONS  (PRN):  acetaminophen     Tablet .. 650 milliGRAM(s) Oral every 6 hours PRN Temp greater or equal to 38C (100.4F), Mild Pain (1 - 3)  aluminum hydroxide/magnesium hydroxide/simethicone Suspension 30 milliLiter(s) Oral every 4 hours PRN Dyspepsia  melatonin 3 milliGRAM(s) Oral at bedtime PRN Insomnia  ondansetron Injectable 4 milliGRAM(s) IV Push every 8 hours PRN Nausea and/or Vomiting    Vital Signs Last 24 Hrs  T(F): 98 (19 Sep 2023 05:08), Max: 98.3 (18 Sep 2023 20:54)  HR: 76 (19 Sep 2023 05:08) (62 - 79)  BP: 159/64 (19 Sep 2023 05:08) (118/64 - 159/64)  RR: 16 (19 Sep 2023 05:08) (16 - 17)  SpO2: 96% (19 Sep 2023 05:08) (95% - 97%)  I&O's Summary    18 Sep 2023 07:01  -  19 Sep 2023 07:00  --------------------------------------------------------  IN: 200 mL / OUT: 200 mL / NET: 0 mL      PHYSICAL EXAM:  General: NAD, Awake, alert  ENT: No gross hearing impairment, Moist mucous membranes, no thrush  Neck: Supple, No JVD  Lungs: Clear to auscultation bilaterally, good air entry, non-labored breathing  Cardio: RRR, S1/S2, No murmur  Abdomen: Soft, Nontender, Nondistended; Bowel sounds present  Extremities: LLE CAM boot in place, b/l LE trace edema, No calf tenderness, No cyanosis  Psych: Appropriate mood and affect    LABS:                        10.9   6.73  )-----------( 243      ( 18 Sep 2023 05:48 )             33.4     09-18    139  |  106  |  46  ----------------------------<  97  5.2   |  25  |  1.40    Ca    9.8      18 Sep 2023 05:48                                        Urinalysis Basic - ( 18 Sep 2023 05:48 )    Color: x / Appearance: x / SG: x / pH: x  Gluc: 97 mg/dL / Ketone: x  / Bili: x / Urobili: x   Blood: x / Protein: x / Nitrite: x   Leuk Esterase: x / RBC: x / WBC x   Sq Epi: x / Non Sq Epi: x / Bacteria: x        COVID-19 PCR: Detected (09-09-23 @ 14:00)  COVID-19 PCR: Detected (09-06-23 @ 16:20)    RADIOLOGY & ADDITIONAL TESTS: < from: MR Head No Cont (09.18.23 @ 11:15) >  IMPRESSION:    No acute intracranial abnormality.    Moderate generalized cerebral volume loss is noted. Moderate dilatation   of the lateral and third ventricles is noted, mildly out of proportion to   the degree of cerebral volume loss. Differential considerations include   central greater than cortical atrophy or normal pressure hydrocephalus.    Right maxillary, ethmoid, frontal sinus opacification suggesting   sinusitis.    < end of copied text >      Care Discussed with Consultants/Other Providers:

## 2023-09-19 NOTE — DISCHARGE NOTE NURSING/CASE MANAGEMENT/SOCIAL WORK - NSDCPEFALRISK_GEN_ALL_CORE
For information on Fall & Injury Prevention, visit: [email or web address] OR  [email or web address] OR  https://www.cdc.gov/steadi/patient.html

## 2023-09-19 NOTE — PROGRESS NOTE ADULT - PROVIDER SPECIALTY LIST ADULT
Hospitalist
Orthopedics
Orthopedics
Hospitalist
Orthopedics
Hospitalist

## 2023-09-19 NOTE — PROGRESS NOTE ADULT - NS ATTEND AMEND GEN_ALL_CORE FT
Awaiting for [facility]. Pain managed well. Neuro to assess NPH
acute kidney injury noted. will start IV hydration until am  MRI pending
JOSEPH to [facility]
Pending MRI to eval for NPH
Responded to IV hydration, acute kidney injury resolved, now at baseline Chronic Kidney Disease 3    MRI report reviewed with Neuro - rec outpatient follow up, no urgent intervention required    Medically stable for dc to [facility]
87 yo F with PMH of Chronic Kidney Disease 3, Dementia who was admitted s/p fall. Found to have acute left distal fibula/lateral malleolus fracture. Orthopedics recommends conservative management     WBAT with CAM boot  PT rec [facility]  Awaiting Podiatry eval     DC to [facility] pending Podiatry eval

## 2023-09-19 NOTE — PROGRESS NOTE ADULT - REASON FOR ADMISSION
fall
Fall, distal fibula/lateral malleolus fractures
fall
fall

## 2023-09-19 NOTE — PROGRESS NOTE ADULT - ASSESSMENT
87 yo F from home with PMH of CKD, dementia, recent COVID + 8/28 s/p paxlovid, presenting with s/p fall with left ankle pain found to have acute left distal fibula/lateral malleolus fracture. She was seen by orthopedics who recommended conservative management.     #Left acute distal fracture of fibula/lateral malleolus, s/p fall  #Gait imbalance  - Ortho following, recommended conservative management for fx  - WBAT with CAM boot   - Pain mgmt PRN  - Bilateral LE ultrasound negative for DVT  - PM&R eval appreciated, given prior functional status and living alone with stairs, feels that patient would be more appropriate for subacute rehab   - Dr. Sesay, physiatry updated the family - now amenable to [facility] placement     #Gait imbalance, short term memory problems, r/o NPH vs AD  - Possible NPH on CT imaging, it is possible that her memory and gait imbalance could be from NPH as per neurology   - Per family she had a MRI 1/23 at [facility] and also showed "fluid in the brain"  - B12/folate/TSH/FT4 in normal range  - MRI brain: moderate dilation of lateral and third ventricles   - Discussed MRI results with Dr. Duarte, no acute intervention, patient may follow up with neuro surgery outpatient after rehab, family is aware   - Neuro consulted, following     #Recent COVID  - Diagnosed 8/28 by [facility], completed paxlovid  - Still testing + for COVID; asymptomatic  - Repeat PCR 9/9 - positive  - Discussed with Infection Control, patient is >10 days since first positive on 8/28, isolation was discontinued     #Hypomag (Resolved)  - Monitor level    #HTN  #HLD  - Continue bystolic and atorvastatin   - Monitor vitals  - Started amlodipine 2.5 mg daily as BP slightly elevated     #Hx of CKD  - Cr 1.4 range in 1/2023, at baseline  - Avoid nephrotoxic agents   - Monitor creatinine    #Onychomycosis  - Podiatry evaluated pt and she had bedside nail debridement, f/u as an outpt for continued nail care    #DVT ppx   - Lovenox    GOC: Full Code     DISPO: D/C planning for [facility]    9/18: Updated patient's daughter-in-law Edelmira at bedside (Edelmira - [phone])

## 2023-09-19 NOTE — DISCHARGE NOTE NURSING/CASE MANAGEMENT/SOCIAL WORK - NSDCPNPNATDISSUGG_GEN_ALL_CORE
Vital signs are stable.  Had first void and stool.  Working on breastfeeding.  Will continue to monitor.   No

## 2023-09-19 NOTE — DISCHARGE NOTE NURSING/CASE MANAGEMENT/SOCIAL WORK - PATIENT PORTAL LINK FT
You can access the FollowMyHealth Patient Portal offered by [facility] by registering at the following website: [email or web address]. By joining [facility] FollowMyHealth portal, you will also be able to view your health information using other applications (apps) compatible with our system.

## 2023-09-26 ENCOUNTER — APPOINTMENT (OUTPATIENT)
Dept: ORTHOPEDIC SURGERY | Facility: CLINIC | Age: 88
End: 2023-09-26
Payer: MEDICARE

## 2023-09-26 PROCEDURE — 99203 OFFICE O/P NEW LOW 30 MIN: CPT

## 2023-09-26 PROCEDURE — 73610 X-RAY EXAM OF ANKLE: CPT | Mod: LT

## 2023-10-06 ENCOUNTER — APPOINTMENT (OUTPATIENT)
Dept: NEUROSURGERY | Facility: CLINIC | Age: 88
End: 2023-10-06
Payer: MEDICARE

## 2023-10-06 ENCOUNTER — NON-APPOINTMENT (OUTPATIENT)
Age: 88
End: 2023-10-06

## 2023-10-06 VITALS
DIASTOLIC BLOOD PRESSURE: 80 MMHG | WEIGHT: 198 LBS | SYSTOLIC BLOOD PRESSURE: 147 MMHG | OXYGEN SATURATION: 99 % | HEIGHT: 65 IN | BODY MASS INDEX: 32.99 KG/M2 | HEART RATE: 76 BPM

## 2023-10-06 DIAGNOSIS — U07.1 COVID-19: ICD-10-CM

## 2023-10-06 DIAGNOSIS — Z87.891 PERSONAL HISTORY OF NICOTINE DEPENDENCE: ICD-10-CM

## 2023-10-06 PROCEDURE — 99205 OFFICE O/P NEW HI 60 MIN: CPT

## 2023-10-10 ENCOUNTER — APPOINTMENT (OUTPATIENT)
Dept: ORTHOPEDIC SURGERY | Facility: CLINIC | Age: 88
End: 2023-10-10
Payer: MEDICARE

## 2023-10-10 PROBLEM — Z87.891 FORMER SMOKER: Status: ACTIVE | Noted: 2018-12-28

## 2023-10-10 PROBLEM — U07.1 COVID-19: Status: RESOLVED | Noted: 2023-10-10 | Resolved: 2023-10-10

## 2023-10-10 PROCEDURE — 99024 POSTOP FOLLOW-UP VISIT: CPT

## 2023-10-10 PROCEDURE — 73610 X-RAY EXAM OF ANKLE: CPT | Mod: LT,TC

## 2023-10-11 RX ORDER — NEBIVOLOL HYDROCHLORIDE 5 MG/1
1 TABLET ORAL
Refills: 0 | DISCHARGE

## 2023-10-11 RX ORDER — CHOLECALCIFEROL (VITAMIN D3) 125 MCG
0 CAPSULE ORAL
Refills: 0 | DISCHARGE

## 2023-10-11 RX ORDER — ATORVASTATIN CALCIUM 80 MG/1
1 TABLET, FILM COATED ORAL
Refills: 0 | DISCHARGE

## 2023-10-17 PROBLEM — N18.9 CHRONIC KIDNEY DISEASE, UNSPECIFIED: Chronic | Status: ACTIVE | Noted: 2023-09-06

## 2023-10-17 PROBLEM — E78.5 HYPERLIPIDEMIA, UNSPECIFIED: Chronic | Status: ACTIVE | Noted: 2023-09-06

## 2023-10-17 PROBLEM — I10 ESSENTIAL (PRIMARY) HYPERTENSION: Chronic | Status: ACTIVE | Noted: 2023-09-06

## 2023-10-25 NOTE — ED ADULT NURSE NOTE - HIV OFFER
Anesthesia Post-op Note    Patient: Alicia Reid  Procedure(s) Performed: LEFT L4-5 MICRODISCECTOMY - LEFT  Anesthesia type: General    Vitals Value Taken Time   Temp 97.4F 10/25/23 0849   Pulse 94 10/25/23 0848   Resp 14 10/25/23 0848   SpO2 98 % 10/25/23 0848   /78 10/25/23 0846   Vitals shown include unvalidated device data.      Patient Location: PACU Phase 1  Post-op Vital Signs:stable  Level of Consciousness: awake, alert, participates in exam and oriented  Respiratory Status: spontaneous ventilation, unassisted and nasal cannula  Cardiovascular stable  Hydration: euvolemic  Pain Management: adequately controlled and well controlled  Handoff: Handoff to receiving clinician was performed and questions were answered  Vomiting: none  Nausea: None  Airway Patency:patent  Post-op Assessment: awake, alert, appropriately conversant, or baseline, no complications, patient tolerated procedure well, no evidence of recall, dentition within defined limits, moving all extremities and No Corneal Abrasion      No notable events documented.   Opt out

## 2023-11-07 ENCOUNTER — APPOINTMENT (OUTPATIENT)
Dept: ORTHOPEDIC SURGERY | Facility: CLINIC | Age: 88
End: 2023-11-07
Payer: MEDICARE

## 2023-11-07 DIAGNOSIS — S82.832D OTHER FRACTURE OF UPPER AND LOWER END OF LEFT FIBULA, SUBSEQUENT ENCOUNTER FOR CLOSED FRACTURE WITH ROUTINE HEALING: ICD-10-CM

## 2023-11-07 PROCEDURE — 99213 OFFICE O/P EST LOW 20 MIN: CPT

## 2023-11-07 PROCEDURE — 73610 X-RAY EXAM OF ANKLE: CPT | Mod: TC

## 2023-12-26 ENCOUNTER — NON-APPOINTMENT (OUTPATIENT)
Age: 88
End: 2023-12-26

## 2023-12-27 ENCOUNTER — APPOINTMENT (OUTPATIENT)
Dept: GERIATRICS | Facility: CLINIC | Age: 88
End: 2023-12-27
Payer: MEDICARE

## 2023-12-27 VITALS
SYSTOLIC BLOOD PRESSURE: 130 MMHG | WEIGHT: 182 LBS | HEIGHT: 65 IN | RESPIRATION RATE: 16 BRPM | OXYGEN SATURATION: 98 % | DIASTOLIC BLOOD PRESSURE: 74 MMHG | BODY MASS INDEX: 30.32 KG/M2 | HEART RATE: 81 BPM | TEMPERATURE: 97.3 F

## 2023-12-27 DIAGNOSIS — G91.9 HYDROCEPHALUS, UNSPECIFIED: ICD-10-CM

## 2023-12-27 DIAGNOSIS — E78.5 HYPERLIPIDEMIA, UNSPECIFIED: ICD-10-CM

## 2023-12-27 DIAGNOSIS — T78.40XA ALLERGY, UNSPECIFIED, INITIAL ENCOUNTER: ICD-10-CM

## 2023-12-27 PROCEDURE — 99204 OFFICE O/P NEW MOD 45 MIN: CPT

## 2023-12-27 RX ORDER — TRIAMTERENE AND HYDROCHLOROTHIAZIDE 25; 37.5 MG/1; MG/1
37.5-25 TABLET ORAL
Refills: 0 | Status: DISCONTINUED | COMMUNITY
End: 2023-12-27

## 2023-12-27 RX ORDER — OMEPRAZOLE 20 MG/1
20 CAPSULE, DELAYED RELEASE ORAL
Refills: 0 | Status: DISCONTINUED | COMMUNITY
End: 2023-12-27

## 2023-12-27 RX ORDER — CHROMIUM 200 MCG
1000 TABLET ORAL
Refills: 0 | Status: DISCONTINUED | COMMUNITY
End: 2023-12-27

## 2023-12-27 NOTE — PHYSICAL EXAM
[Alert] : alert [No Acute Distress] : in no acute distress [Sclera] : the sclera and conjunctiva were normal [EOMI] : extraocular movements were intact [Normal Outer Ear/Nose] : the ears and nose were normal in appearance [Normal Appearance] : the appearance of the neck was normal [Supple] : the neck was supple [No Respiratory Distress] : no respiratory distress [No Acc Muscle Use] : no accessory muscle use [Respiration, Rhythm And Depth] : normal respiratory rhythm and effort [Auscultation Breath Sounds / Voice Sounds] : lungs were clear to auscultation bilaterally [Normal S1, S2] : normal S1 and S2 [Murmurs] : no murmurs [Heart Rate And Rhythm] : heart rate was normal and rhythm regular [Bowel Sounds] : normal bowel sounds [Abdomen Tenderness] : non-tender [Abdomen Soft] : soft [Supraclavicular Lymph Nodes Enlarged Bilaterally] : supraclavicular [Cervical Lymph Nodes Enlarged Posterior Bilaterally] : posterior cervical [Cervical Lymph Nodes Enlarged Anterior Bilaterally] : anterior cervical, supraclavicular [No CVA Tenderness] : no CVA  tenderness [No Spinal Tenderness] : no spinal tenderness [No Clubbing, Cyanosis] : no clubbing or cyanosis of the fingernails [Involuntary Movements] : no involuntary movements were seen [Normal Color / Pigmentation] : normal skin color and pigmentation [No Focal Deficits] : no focal deficits [Normal Affect] : the affect was normal [Normal Mood] : the mood was normal [de-identified] : left leg edema >> right chronically [de-identified] : unsteady gait, surgical scar b/l knees [SlumsTotal] : 15

## 2023-12-27 NOTE — HISTORY OF PRESENT ILLNESS
[FreeTextEntry1] : 88yoF seen as new patient accompanied by her daughter Edelmira.  recent fall with hospitalization and rehab stay unsteady gait pending staring PT at home  prior to fall was living in [facility] on her own. she is now staying with her daugther. needs help with showering and all IADL"s they may be planning for eventually looking into senior living's in [place] near son who is MD (ortho)  Dementia: hydrocephalus:  evaluted by neurosx Dr. Nagy recommending no surgical interventions for hydrocephalus unsteady gait denies urinary incontinece. SLUMS  12/2023 15/30 reports hx of neuropsych testing about 1 year ago- will need to find results MRI 9/2023 reviewed  sleep okay eating okay  CKD: hx of hyperkalemia requiring treatment asking for home draw  patient repots allergy to chocolate - but unclear if fully allergic and wanting to have allergy testing performed.   ACP: MOLST form on file: DNR, trial of intubation

## 2023-12-27 NOTE — ASSESSMENT
[FreeTextEntry1] : ckd: cr 1.32 12/4/23 K 5.6 with hx of hyperkalemia repeat labwork this week with home draw avoid nephrotoxins  dementia: SLUMS 15/30 hydrocephalus seen by neurosx recommend no interventions check labwork follow up 3 months for memory and function  unsteady gait: will be starting home PT fall precautions  htn: echo 10/2023: diastolic dysfunction with nromal sys function ekg 12/14/2023: NSR with first degree AV block and Left axis deviation stable c/w norvasc 2.5mg daily c/w coreg 3.125 q12hr  hld: stable c/w lipitor 10mg  qhs  edema: c/w lasix 20mg daily

## 2023-12-27 NOTE — REVIEW OF SYSTEMS
[Lower Ext Edema] : lower extremity edema [Joint Stiffness] : joint stiffness [As Noted in HPI] : as noted in HPI [Negative] : Heme/Lymph [FreeTextEntry3] : glasses [FreeTextEntry5] : chronically left edema .> right

## 2023-12-28 ENCOUNTER — LABORATORY RESULT (OUTPATIENT)
Age: 88
End: 2023-12-28

## 2023-12-29 ENCOUNTER — TRANSCRIPTION ENCOUNTER (OUTPATIENT)
Age: 88
End: 2023-12-29

## 2024-02-14 ENCOUNTER — APPOINTMENT (OUTPATIENT)
Dept: PEDIATRIC ALLERGY IMMUNOLOGY | Facility: CLINIC | Age: 89
End: 2024-02-14
Payer: MEDICARE

## 2024-02-14 DIAGNOSIS — Z91.018 ALLERGY TO OTHER FOODS: ICD-10-CM

## 2024-02-14 PROCEDURE — 99203 OFFICE O/P NEW LOW 30 MIN: CPT

## 2024-02-14 NOTE — HISTORY OF PRESENT ILLNESS
[Asthma] : asthma [Allergic Rhinitis] : allergic rhinitis [Eczematous rashes] : eczematous rashes [Venom Reactions] : venom reactions [de-identified] : 88 year old woman who presents for evaluation of sulfa and chocolate allergy.  She was told she was allergic to sulfa about 25 years ago.  The history isn't clear and doesn't know what it was to or what the symptoms are.  Chocolate was about 30 years ago when she felt like her throat was closing and has avoided chocolate since.  No other symptoms and did not take anything.  She recently had chocolate in october and no reaction.  Shes unsure which chocolate specifically.  Egg yolks will cause GI symptoms so she avoids them. She will tolerate hard boiled egg.    She has chronic sinusitis.  She hasn't had an attack in a few years and doesn't take anything.  She does have post nasal drip.

## 2024-02-14 NOTE — REASON FOR VISIT
[Initial Consultation] : an initial consultation for [Allergy Evaluation/ Skin Testing] : allergy evaluation and or skin testing [To Medication] : allergy to medication [To Food] : allergy to food [Family Member] : family member

## 2024-02-14 NOTE — SOCIAL HISTORY
[Dog] : dog [Bedroom] : not in the bedroom [Living Area] : not in the living area [Smokers in Household] : there are no smokers in the home

## 2024-02-14 NOTE — PHYSICAL EXAM
[Alert] : alert [Well Nourished] : well nourished [No Acute Distress] : no acute distress [Well Developed] : well developed [No Discharge] : no discharge [Sclera Not Icteric] : sclera not icteric [Normal Rate and Effort] : normal respiratory rhythm and effort [No Crackles] : no crackles [Normal Rate] : heart rate was normal  [Normal S1, S2] : normal S1 and S2 [Regular Rhythm] : with a regular rhythm [Skin Intact] : skin intact  [Conjunctival Erythema] : no conjunctival erythema [Wheezing] : no wheezing was heard

## 2024-02-14 NOTE — CONSULT LETTER
[Dear  ___] : Dear  [unfilled], [Courtesy Letter:] : I had the pleasure of seeing your patient, [unfilled], in my office today. [Please see my note below.] : Please see my note below. [Consult Closing:] : Thank you very much for allowing me to participate in the care of this patient.  If you have any questions, please do not hesitate to contact me. [Sincerely,] : Sincerely, [FreeTextEntry3] : Dr. Jake Basilio

## 2024-02-20 ENCOUNTER — APPOINTMENT (OUTPATIENT)
Dept: GERIATRICS | Facility: CLINIC | Age: 89
End: 2024-02-20
Payer: MEDICARE

## 2024-02-20 VITALS
WEIGHT: 184 LBS | DIASTOLIC BLOOD PRESSURE: 80 MMHG | HEART RATE: 83 BPM | TEMPERATURE: 98.6 F | HEIGHT: 65 IN | OXYGEN SATURATION: 95 % | BODY MASS INDEX: 30.66 KG/M2 | SYSTOLIC BLOOD PRESSURE: 138 MMHG

## 2024-02-20 DIAGNOSIS — I10 ESSENTIAL (PRIMARY) HYPERTENSION: ICD-10-CM

## 2024-02-20 DIAGNOSIS — F03.90 UNSPECIFIED DEMENTIA W/OUT BEHAVIORAL DISTURBANCE: ICD-10-CM

## 2024-02-20 DIAGNOSIS — N18.30 CHRONIC KIDNEY DISEASE, STAGE 3 UNSPECIFIED: ICD-10-CM

## 2024-02-20 DIAGNOSIS — Z02.2 ENCOUNTER FOR EXAMINATION FOR ADMISSION TO RESIDENTIAL INSTITUTION: ICD-10-CM

## 2024-02-20 DIAGNOSIS — Z11.1 ENCOUNTER FOR SCREENING FOR RESPIRATORY TUBERCULOSIS: ICD-10-CM

## 2024-02-20 PROCEDURE — 99214 OFFICE O/P EST MOD 30 MIN: CPT

## 2024-02-20 PROCEDURE — G2211 COMPLEX E/M VISIT ADD ON: CPT

## 2024-02-20 RX ORDER — NEBIVOLOL HYDROCHLORIDE 5 MG/1
5 TABLET ORAL
Refills: 0 | Status: DISCONTINUED | COMMUNITY
End: 2024-02-20

## 2024-02-22 ENCOUNTER — LABORATORY RESULT (OUTPATIENT)
Age: 89
End: 2024-02-22

## 2024-02-23 NOTE — HISTORY OF PRESENT ILLNESS
[FreeTextEntry1] : 88yoF with Dementia seen for evaluation prior to moving into [facility] in [place].  she is without acute complaints today medications reconcilled htn: adherent with medications, w/o s/e. there was increase in her norvasc from 2.5mg to 5mg  she requires assistance with medications   walks with walker currently working with PT and will need to continue this once at new facility    [No falls in past year] : Patient reported no falls in the past year

## 2024-02-23 NOTE — ASSESSMENT
[FreeTextEntry1] : check quant gold - arrange for home draw  medications updated - dtr will call us with new pharm info to escribe  planning to move in early March  CKD: planning to have repeat labwork once at [facility] prior labwork has been stable  Dementia: [facility] with medication management

## 2024-02-23 NOTE — REVIEW OF SYSTEMS
[Loss Of Hearing] : hearing loss [Eyesight Problems] : eyesight problems [Lower Ext Edema] : lower extremity edema [Negative] : Integumentary

## 2024-02-23 NOTE — PHYSICAL EXAM
[Alert] : alert [No Acute Distress] : in no acute distress [EOMI] : extraocular movements were intact [Sclera] : the sclera and conjunctiva were normal [Normal Outer Ear/Nose] : the ears and nose were normal in appearance [No Respiratory Distress] : no respiratory distress [Normal Appearance] : the appearance of the neck was normal [No Acc Muscle Use] : no accessory muscle use [Auscultation Breath Sounds / Voice Sounds] : lungs were clear to auscultation bilaterally [Respiration, Rhythm And Depth] : normal respiratory rhythm and effort [Normal S1, S2] : normal S1 and S2 [Heart Rate And Rhythm] : heart rate was normal and rhythm regular [Normal Color / Pigmentation] : normal skin color and pigmentation [Involuntary Movements] : no involuntary movements were seen [Normal Mood] : the mood was normal [Normal Affect] : the affect was normal

## 2024-03-07 RX ORDER — ATORVASTATIN CALCIUM 10 MG/1
10 TABLET, FILM COATED ORAL
Qty: 30 | Refills: 1 | Status: ACTIVE | COMMUNITY
Start: 1900-01-01 | End: 1900-01-01

## 2024-03-07 RX ORDER — AMLODIPINE BESYLATE 5 MG/1
5 TABLET ORAL DAILY
Qty: 30 | Refills: 1 | Status: ACTIVE | COMMUNITY
Start: 2023-12-27 | End: 1900-01-01

## 2024-03-07 RX ORDER — FUROSEMIDE 20 MG/1
20 TABLET ORAL
Qty: 30 | Refills: 1 | Status: ACTIVE | COMMUNITY
Start: 2023-12-27 | End: 1900-01-01

## 2024-03-07 RX ORDER — CARVEDILOL 3.12 MG/1
3.12 TABLET, FILM COATED ORAL
Qty: 60 | Refills: 1 | Status: ACTIVE | COMMUNITY
Start: 2023-12-27 | End: 1900-01-01

## 2024-03-07 RX ORDER — DOCUSATE SODIUM 100 MG/1
100 CAPSULE ORAL
Qty: 30 | Refills: 1 | Status: ACTIVE | COMMUNITY
Start: 2024-02-20 | End: 1900-01-01

## 2024-03-07 RX ORDER — MULTIVIT-MIN/FOLIC/VIT K/LYCOP 400-300MCG
25 MCG TABLET ORAL
Qty: 30 | Refills: 1 | Status: ACTIVE | COMMUNITY
Start: 2024-02-20 | End: 1900-01-01

## 2024-06-03 NOTE — OCCUPATIONAL THERAPY INITIAL EVALUATION ADULT - OCCUPATION
[facility] Note: Route Optimization for Dexamethasone (Decadron)    Patient is currently on Dexamethasone (Decadron) 6 mg IV every 24 hours.    The patient meets the criteria to convert to the oral equivalent as established by the IV to Oral conversion pr Retired  Calm